# Patient Record
Sex: MALE | Race: WHITE | NOT HISPANIC OR LATINO | Employment: FULL TIME | URBAN - METROPOLITAN AREA
[De-identification: names, ages, dates, MRNs, and addresses within clinical notes are randomized per-mention and may not be internally consistent; named-entity substitution may affect disease eponyms.]

---

## 2019-10-21 ENCOUNTER — OFFICE VISIT (OUTPATIENT)
Dept: UROLOGY | Facility: CLINIC | Age: 69
End: 2019-10-21
Payer: MEDICARE

## 2019-10-21 VITALS
SYSTOLIC BLOOD PRESSURE: 112 MMHG | BODY MASS INDEX: 23.78 KG/M2 | TEMPERATURE: 98 F | DIASTOLIC BLOOD PRESSURE: 82 MMHG | WEIGHT: 148 LBS | RESPIRATION RATE: 18 BRPM | HEIGHT: 66 IN | HEART RATE: 64 BPM

## 2019-10-21 DIAGNOSIS — E13.9 DIABETES 1.5, MANAGED AS TYPE 2: Primary | ICD-10-CM

## 2019-10-21 DIAGNOSIS — N40.1 BPH WITH URINARY OBSTRUCTION: ICD-10-CM

## 2019-10-21 DIAGNOSIS — N13.8 BPH WITH URINARY OBSTRUCTION: ICD-10-CM

## 2019-10-21 PROCEDURE — 99213 OFFICE O/P EST LOW 20 MIN: CPT | Mod: S$GLB,,, | Performed by: UROLOGY

## 2019-10-21 PROCEDURE — 99213 PR OFFICE/OUTPT VISIT, EST, LEVL III, 20-29 MIN: ICD-10-PCS | Mod: S$GLB,,, | Performed by: UROLOGY

## 2019-10-21 RX ORDER — GLYBURIDE 3 MG/1
3 TABLET ORAL
Qty: 90 TABLET | Refills: 3 | Status: SHIPPED | OUTPATIENT
Start: 2019-10-21 | End: 2022-01-26

## 2019-10-21 RX ORDER — DOXAZOSIN 4 MG/1
4 TABLET ORAL DAILY
Qty: 30 TABLET | Refills: 11 | Status: SHIPPED | OUTPATIENT
Start: 2019-10-21 | End: 2020-10-20

## 2019-10-21 NOTE — PROGRESS NOTES
Subjective:       Patient ID: Cayden Handy is a 69 y.o. male.    Chief Complaint: Annual Exam      HPI: bph with obstruction doing well      Past Medical History:   Past Medical History:   Diagnosis Date    Diabetes 10/21/2019    Retention, urine 10/21/2019       Past Surgical Historical:   Past Surgical History:   Procedure Laterality Date    KIDNEY STONE SURGERY       lithotripsy x2    vericocelectomy      a very long time cooley/ pt does not remember which side        Medications:   Medication List with Changes/Refills   New Medications    DOXAZOSIN (CARDURA) 4 MG TABLET    Take 1 tablet (4 mg total) by mouth once daily.    GLYBURIDE MICRONIZED (GLYNASE) 3 MG TAB    Take 1 tablet (3 mg total) by mouth daily with breakfast.        Past Social History:   Social History     Socioeconomic History    Marital status:      Spouse name: Not on file    Number of children: Not on file    Years of education: Not on file    Highest education level: Not on file   Occupational History    Not on file   Social Needs    Financial resource strain: Not on file    Food insecurity:     Worry: Not on file     Inability: Not on file    Transportation needs:     Medical: Not on file     Non-medical: Not on file   Tobacco Use    Smoking status: Never Smoker    Smokeless tobacco: Never Used   Substance and Sexual Activity    Alcohol use: Yes     Frequency: Monthly or less    Drug use: Never    Sexual activity: Yes   Lifestyle    Physical activity:     Days per week: Not on file     Minutes per session: Not on file    Stress: Not on file   Relationships    Social connections:     Talks on phone: Not on file     Gets together: Not on file     Attends Congregational service: Not on file     Active member of club or organization: Not on file     Attends meetings of clubs or organizations: Not on file     Relationship status: Not on file   Other Topics Concern    Not on file   Social History Narrative    Not on file        Allergies:   Review of patient's allergies indicates:   Allergen Reactions    Grass pollen-june grass standard         Family History: History reviewed. No pertinent family history.     Review of Systems:  Review of Systems   Constitutional: Negative for activity change and appetite change.   HENT: Negative for congestion and dental problem.    Respiratory: Negative for chest tightness and shortness of breath.    Cardiovascular: Negative for chest pain.   Gastrointestinal: Negative for abdominal distention and abdominal pain.   Genitourinary: Negative for decreased urine volume, difficulty urinating, discharge, dysuria, enuresis, flank pain, frequency, genital sores, hematuria, penile pain, penile swelling, scrotal swelling, testicular pain and urgency.   Musculoskeletal: Negative for back pain and neck pain.   Neurological: Negative for dizziness.   Hematological: Negative for adenopathy.   Psychiatric/Behavioral: Negative for agitation, behavioral problems and confusion.       Physical Exam:  Physical Exam   Nursing note and vitals reviewed.  Constitutional: He is oriented to person, place, and time. He appears well-developed and well-nourished.   HENT:   Head: Normocephalic.   Cardiovascular: Normal rate, regular rhythm and normal heart sounds.    Pulmonary/Chest: Effort normal and breath sounds normal.   Abdominal: Soft. Bowel sounds are normal.   Genitourinary: Rectum normal, prostate normal, testes normal and penis normal.   Neurological: He is alert and oriented to person, place, and time.   Skin: Skin is warm and dry.         Assessment/Plan:     bph with obstruction  Check psa fu one year  Problem List Items Addressed This Visit     None      Visit Diagnoses     Diabetes 1.5, managed as type 2    -  Primary    Relevant Medications    glyBURIDE micronized (GLYNASE) 3 MG Tab    BPH with urinary obstruction        Relevant Medications    doxazosin (CARDURA) 4 MG tablet    glyBURIDE micronized (GLYNASE)  3 MG Tab

## 2020-10-21 ENCOUNTER — OFFICE VISIT (OUTPATIENT)
Dept: UROLOGY | Facility: CLINIC | Age: 70
End: 2020-10-21
Payer: MEDICARE

## 2020-10-21 DIAGNOSIS — N13.8 BPH WITH URINARY OBSTRUCTION: Primary | ICD-10-CM

## 2020-10-21 DIAGNOSIS — N40.1 BPH WITH URINARY OBSTRUCTION: Primary | ICD-10-CM

## 2020-10-21 LAB — POC RESIDUAL URINE VOLUME: 165 ML (ref 0–100)

## 2020-10-21 PROCEDURE — 51798 US URINE CAPACITY MEASURE: CPT | Mod: S$GLB,,, | Performed by: UROLOGY

## 2020-10-21 PROCEDURE — 51798 POCT BLADDER SCAN: ICD-10-PCS | Mod: S$GLB,,, | Performed by: UROLOGY

## 2020-10-21 PROCEDURE — 99213 PR OFFICE/OUTPT VISIT, EST, LEVL III, 20-29 MIN: ICD-10-PCS | Mod: S$GLB,,, | Performed by: UROLOGY

## 2020-10-21 PROCEDURE — 99213 OFFICE O/P EST LOW 20 MIN: CPT | Mod: S$GLB,,, | Performed by: UROLOGY

## 2020-10-21 RX ORDER — DOXAZOSIN 4 MG/1
4 TABLET ORAL NIGHTLY
COMMUNITY
End: 2023-06-22

## 2020-10-21 RX ORDER — MONTELUKAST SODIUM 10 MG/1
TABLET ORAL
COMMUNITY
Start: 2020-10-19

## 2020-10-21 RX ORDER — DAPAGLIFLOZIN AND METFORMIN HYDROCHLORIDE 5; 1000 MG/1; MG/1
10 TABLET, FILM COATED, EXTENDED RELEASE ORAL EVERY MORNING
COMMUNITY
Start: 2020-10-19 | End: 2022-01-26

## 2020-10-21 NOTE — PROGRESS NOTES
Subjective:       Patient ID: Cayden Handy is a 70 y.o. male.    Chief Complaint: Other (yearly )      HPI: 52-year-old male, patient Dr. Ortiz, presents for yearly evaluation.  Patient has a history of BPH with obstruction.  Patient is on Cardura 4 mg daily.  Patient states he is doing.  Patient any pain or burning urination.  Denies any difficulty voiding.  States he has a good stream for the most part.  Patient does complaining of a weak stream in the morning.  Denies any blood in his urine.  Denies any significant weight loss.    No other urinary complaints.  All other health problems appear stable at this time.       Past Medical History:   Past Medical History:   Diagnosis Date    Diabetes 10/21/2019    Retention, urine 10/21/2019       Past Surgical Historical:   Past Surgical History:   Procedure Laterality Date    KIDNEY STONE SURGERY       lithotripsy x2    vericocelectomy      a very long time cooley/ pt does not remember which side        Medications:   Medication List with Changes/Refills   Current Medications    DOXAZOSIN (CARDURA) 4 MG TABLET    Take 1 tablet (4 mg total) by mouth once daily.    DOXAZOSIN (CARDURA) 4 MG TABLET    Take 4 mg by mouth every evening.    GLYBURIDE MICRONIZED (GLYNASE) 3 MG TAB    Take 1 tablet (3 mg total) by mouth daily with breakfast.    MONTELUKAST (SINGULAIR) 10 MG TABLET        XIGDUO XR 5-1,000 MG TBPH            Past Social History:   Social History     Socioeconomic History    Marital status:      Spouse name: Not on file    Number of children: Not on file    Years of education: Not on file    Highest education level: Not on file   Occupational History    Not on file   Social Needs    Financial resource strain: Not on file    Food insecurity     Worry: Not on file     Inability: Not on file    Transportation needs     Medical: Not on file     Non-medical: Not on file   Tobacco Use    Smoking status: Never Smoker    Smokeless tobacco: Never  Used   Substance and Sexual Activity    Alcohol use: Yes     Frequency: Monthly or less    Drug use: Never    Sexual activity: Yes   Lifestyle    Physical activity     Days per week: Not on file     Minutes per session: Not on file    Stress: Not on file   Relationships    Social connections     Talks on phone: Not on file     Gets together: Not on file     Attends [employer]ats[employer] service: Not on file     Active member of club or organization: Not on file     Attends meetings of clubs or organizations: Not on file     Relationship status: Not on file   Other Topics Concern    Not on file   Social History Narrative    Not on file       Allergies:   Review of patient's allergies indicates:   Allergen Reactions    Grass pollen-ray grass standard         Family History: History reviewed. No pertinent family history.     Review of Systems:  Review of Systems   Constitutional: Negative for activity change and appetite change.   HENT: Negative for congestion and dental problem.    Respiratory: Negative for chest tightness and shortness of breath.    Cardiovascular: Negative for chest pain.   Gastrointestinal: Negative for abdominal distention and abdominal pain.   Genitourinary: Negative for decreased urine volume, difficulty urinating, discharge, dysuria, enuresis, flank pain, frequency, genital sores, hematuria, penile pain, penile swelling, scrotal swelling, testicular pain and urgency.   Musculoskeletal: Negative for back pain and neck pain.   Neurological: Negative for dizziness.   Hematological: Negative for adenopathy.   Psychiatric/Behavioral: Negative for agitation, behavioral problems and confusion.       Physical Exam:  Physical Exam  Vitals signs and nursing note reviewed.   Constitutional:       Appearance: He is well-developed.   HENT:      Head: Normocephalic.   Cardiovascular:      Rate and Rhythm: Normal rate and regular rhythm.      Heart sounds: Normal heart sounds.   Pulmonary:      Effort:  Pulmonary effort is normal.      Breath sounds: Normal breath sounds.   Abdominal:      General: Bowel sounds are normal.      Palpations: Abdomen is soft.   Genitourinary:     Prostate: Normal.      Comments: Prostate exam is benign.  Prostate is smooth with no nodules and nontender.  Prostate is symmetrical.  Skin:     General: Skin is warm and dry.   Neurological:      Mental Status: He is alert and oriented to person, place, and time.      Bladder scan:  155 cc.    Assessment/Plan:   BPH with obstruction:  Will check the patient's PSA.  We will notify him of any abnormal results.  Patient's Cardura is prescribed by his primary care doctor.  Patient can notify us if he needs any refills.    Patient follow-up in 1 year, sooner if needed.    Problem List Items Addressed This Visit     None      Visit Diagnoses     BPH with urinary obstruction    -  Primary    Relevant Orders    POCT Bladder Scan    Prostate Specific Antigen, Diagnostic

## 2020-10-22 ENCOUNTER — DOCUMENTATION ONLY (OUTPATIENT)
Dept: UROLOGY | Facility: CLINIC | Age: 70
End: 2020-10-22

## 2021-10-26 ENCOUNTER — TELEPHONE (OUTPATIENT)
Dept: UROLOGY | Facility: CLINIC | Age: 71
End: 2021-10-26

## 2021-10-26 ENCOUNTER — OFFICE VISIT (OUTPATIENT)
Dept: UROLOGY | Facility: CLINIC | Age: 71
End: 2021-10-26
Payer: MEDICARE

## 2021-10-26 VITALS — HEIGHT: 66 IN | WEIGHT: 130 LBS | BODY MASS INDEX: 20.89 KG/M2

## 2021-10-26 DIAGNOSIS — R31.29 HEMATURIA, MICROSCOPIC: ICD-10-CM

## 2021-10-26 DIAGNOSIS — N40.1 BPH WITH URINARY OBSTRUCTION: Primary | ICD-10-CM

## 2021-10-26 DIAGNOSIS — N13.8 BPH WITH URINARY OBSTRUCTION: Primary | ICD-10-CM

## 2021-10-26 LAB
POC RESIDUAL URINE VOLUME: 128 ML (ref 0–100)
PSA, DIAGNOSTIC: 0.69 NG/ML (ref 0–4)

## 2021-10-26 PROCEDURE — 99214 OFFICE O/P EST MOD 30 MIN: CPT | Mod: S$GLB,,, | Performed by: NURSE PRACTITIONER

## 2021-10-26 PROCEDURE — 3008F PR BODY MASS INDEX (BMI) DOCUMENTED: ICD-10-PCS | Mod: CPTII,S$GLB,, | Performed by: NURSE PRACTITIONER

## 2021-10-26 PROCEDURE — 1126F AMNT PAIN NOTED NONE PRSNT: CPT | Mod: CPTII,S$GLB,, | Performed by: NURSE PRACTITIONER

## 2021-10-26 PROCEDURE — 1159F MED LIST DOCD IN RCRD: CPT | Mod: CPTII,S$GLB,, | Performed by: NURSE PRACTITIONER

## 2021-10-26 PROCEDURE — 51798 POCT BLADDER SCAN: ICD-10-PCS | Mod: S$GLB,,, | Performed by: NURSE PRACTITIONER

## 2021-10-26 PROCEDURE — 51798 US URINE CAPACITY MEASURE: CPT | Mod: S$GLB,,, | Performed by: NURSE PRACTITIONER

## 2021-10-26 PROCEDURE — 3008F BODY MASS INDEX DOCD: CPT | Mod: CPTII,S$GLB,, | Performed by: NURSE PRACTITIONER

## 2021-10-26 PROCEDURE — 1160F RVW MEDS BY RX/DR IN RCRD: CPT | Mod: CPTII,S$GLB,, | Performed by: NURSE PRACTITIONER

## 2021-10-26 PROCEDURE — 1160F PR REVIEW ALL MEDS BY PRESCRIBER/CLIN PHARMACIST DOCUMENTED: ICD-10-PCS | Mod: CPTII,S$GLB,, | Performed by: NURSE PRACTITIONER

## 2021-10-26 PROCEDURE — 1159F PR MEDICATION LIST DOCUMENTED IN MEDICAL RECORD: ICD-10-PCS | Mod: CPTII,S$GLB,, | Performed by: NURSE PRACTITIONER

## 2021-10-26 PROCEDURE — 1126F PR PAIN SEVERITY QUANTIFIED, NO PAIN PRESENT: ICD-10-PCS | Mod: CPTII,S$GLB,, | Performed by: NURSE PRACTITIONER

## 2021-10-26 PROCEDURE — 99214 PR OFFICE/OUTPT VISIT, EST, LEVL IV, 30-39 MIN: ICD-10-PCS | Mod: S$GLB,,, | Performed by: NURSE PRACTITIONER

## 2021-10-26 RX ORDER — ROSUVASTATIN CALCIUM 20 MG/1
TABLET, COATED ORAL
COMMUNITY
Start: 2021-09-10

## 2021-10-26 RX ORDER — GABAPENTIN 300 MG/1
300 CAPSULE ORAL NIGHTLY
COMMUNITY
Start: 2021-08-03

## 2021-10-26 RX ORDER — BLOOD SUGAR DIAGNOSTIC
STRIP MISCELLANEOUS
COMMUNITY
Start: 2021-08-05

## 2022-01-26 ENCOUNTER — TELEPHONE (OUTPATIENT)
Dept: UROLOGY | Facility: CLINIC | Age: 72
End: 2022-01-26

## 2022-01-26 ENCOUNTER — OFFICE VISIT (OUTPATIENT)
Dept: UROLOGY | Facility: CLINIC | Age: 72
End: 2022-01-26
Payer: MEDICARE

## 2022-01-26 VITALS
SYSTOLIC BLOOD PRESSURE: 150 MMHG | BODY MASS INDEX: 19.93 KG/M2 | WEIGHT: 124 LBS | RESPIRATION RATE: 20 BRPM | DIASTOLIC BLOOD PRESSURE: 87 MMHG | HEIGHT: 66 IN | HEART RATE: 65 BPM

## 2022-01-26 DIAGNOSIS — N13.8 BPH WITH URINARY OBSTRUCTION: Primary | ICD-10-CM

## 2022-01-26 DIAGNOSIS — N40.1 BPH WITH URINARY OBSTRUCTION: Primary | ICD-10-CM

## 2022-01-26 DIAGNOSIS — Z87.448 H/O URETHRAL STRICTURE: ICD-10-CM

## 2022-01-26 DIAGNOSIS — N52.9 ERECTILE DYSFUNCTION, UNSPECIFIED ERECTILE DYSFUNCTION TYPE: ICD-10-CM

## 2022-01-26 DIAGNOSIS — R31.29 HEMATURIA, MICROSCOPIC: ICD-10-CM

## 2022-01-26 LAB
POC RESIDUAL URINE VOLUME: 173 ML (ref 0–100)
PSA, DIAGNOSTIC: 0.73 NG/ML (ref 0–4)

## 2022-01-26 PROCEDURE — 99214 OFFICE O/P EST MOD 30 MIN: CPT | Mod: S$GLB,,, | Performed by: NURSE PRACTITIONER

## 2022-01-26 PROCEDURE — 51798 POCT BLADDER SCAN: ICD-10-PCS | Mod: S$GLB,,, | Performed by: NURSE PRACTITIONER

## 2022-01-26 PROCEDURE — 99214 PR OFFICE/OUTPT VISIT, EST, LEVL IV, 30-39 MIN: ICD-10-PCS | Mod: S$GLB,,, | Performed by: NURSE PRACTITIONER

## 2022-01-26 PROCEDURE — 51798 US URINE CAPACITY MEASURE: CPT | Mod: S$GLB,,, | Performed by: NURSE PRACTITIONER

## 2022-01-26 RX ORDER — CODEINE PHOSPHATE AND GUAIFENESIN 10; 100 MG/5ML; MG/5ML
SOLUTION ORAL
COMMUNITY
Start: 2022-01-21 | End: 2022-06-16 | Stop reason: ALTCHOICE

## 2022-01-26 RX ORDER — DAPAGLIFLOZIN AND METFORMIN HYDROCHLORIDE 10; 1000 MG/1; MG/1
TABLET, FILM COATED, EXTENDED RELEASE ORAL
COMMUNITY
Start: 2022-01-08 | End: 2022-06-16 | Stop reason: ALTCHOICE

## 2022-01-26 RX ORDER — TADALAFIL 10 MG/1
10 TABLET ORAL DAILY PRN
Qty: 30 TABLET | Refills: 11 | Status: SHIPPED | OUTPATIENT
Start: 2022-01-26 | End: 2023-06-22

## 2022-01-26 RX ORDER — PREDNISONE 10 MG/1
TABLET ORAL
COMMUNITY
Start: 2022-01-21 | End: 2022-06-16 | Stop reason: ALTCHOICE

## 2022-01-26 NOTE — PROGRESS NOTES
Subjective:       Patient ID: Cayden Handy is a 71 y.o. male.    Chief Complaint: Benign Prostatic Hypertrophy (3 month f/u)      HPI: 71-year-old male, established patient, presents for 3 month follow-up.  Patient has history BPH with obstruction.  He is on doxazosin 4 mg daily.  At last visit blood was noted in his urine.  Patient had trace intact blood with red blood cell 6-10.  Patient presents today for re-evaluation of hematuria.  Patient states he does have a history kidney stones.  Patient states he has a history of urethral scar tissue with surgical repair in the past.    Patient does state he has some frequency.  Patient states he voids often throughout the day.  He denies any nocturia.  States the stream is pretty good.  Denies any difficulty or straining voiding.    Patient complains of some erectile dysfunction.  Patient states he has difficulty getting and maintaining erections.  Also erections are not strong.    Denies any blood in urine.  Denies any flank pain.  Denies any odor urine.  Denies any fever.  Denies any body aches.  No other urinary complaints at this time.       Past Medical History:   Past Medical History:   Diagnosis Date    Diabetes 10/21/2019    Retention, urine 10/21/2019       Past Surgical Historical:   Past Surgical History:   Procedure Laterality Date    KIDNEY STONE SURGERY       lithotripsy x2    vericocelectomy      a very long time cooley/ pt does not remember which side        Medications:   Medication List with Changes/Refills   New Medications    TADALAFIL (CIALIS) 10 MG TABLET    Take 1 tablet (10 mg total) by mouth daily as needed for Erectile Dysfunction.   Current Medications    CONTOUR NEXT TEST STRIPS STRP    USE TO CHECK GLUCOSE EVERY MORNING AS DIRECTED    DOXAZOSIN (CARDURA) 4 MG TABLET    Take 4 mg by mouth every evening.    GABAPENTIN (NEURONTIN) 300 MG CAPSULE    Take 300 mg by mouth nightly.    GUAIFENESIN-CODEINE 100-10 MG/5 ML (TUSSI-ORGANIDIN NR)   MG/5 ML SYRUP    TAKE 5 ML BY MOUTH EVERY FOUR HOURS AS NEEDED FOR NIGHT TIME COUGH    MONTELUKAST (SINGULAIR) 10 MG TABLET        PREDNISONE (DELTASONE) 10 MG TABLET    TAKE 3 TABLETS BY MOUTH EVERY MORNING WITH FOOD FOR 5 DAYS    ROSUVASTATIN (CRESTOR) 20 MG TABLET        XIGDUO XR 10-1,000 MG TBPH       Discontinued Medications    GLYBURIDE MICRONIZED (GLYNASE) 3 MG TAB    Take 1 tablet (3 mg total) by mouth daily with breakfast.    XIGDUO XR 5-1,000 MG TBPH    Take 10 mg by mouth every morning.        Past Social History:   Social History     Socioeconomic History    Marital status:    Tobacco Use    Smoking status: Never Smoker    Smokeless tobacco: Never Used   Substance and Sexual Activity    Alcohol use: Yes    Drug use: Never    Sexual activity: Yes       Allergies:   Review of patient's allergies indicates:   Allergen Reactions    Grass pollen-ray grass standard         Family History: History reviewed. No pertinent family history.     Review of Systems:  Review of Systems   Constitutional: Negative for activity change and appetite change.   HENT: Negative for congestion and dental problem.    Eyes: Negative for visual disturbance.   Respiratory: Negative for chest tightness and shortness of breath.    Cardiovascular: Negative for chest pain.   Gastrointestinal: Negative for abdominal distention and abdominal pain.   Genitourinary: Positive for frequency and hematuria. Negative for decreased urine volume, difficulty urinating, dysuria, enuresis, flank pain, genital sores, penile discharge, penile pain, penile swelling, scrotal swelling, testicular pain and urgency.   Musculoskeletal: Negative for back pain and neck pain.   Skin: Negative for color change.   Neurological: Negative for dizziness.   Hematological: Negative for adenopathy.   Psychiatric/Behavioral: Negative for agitation, behavioral problems and confusion.       Physical Exam:  Physical Exam  Vitals and nursing note  reviewed.   Constitutional:       Appearance: He is well-developed and well-nourished.   HENT:      Head: Normocephalic.   Eyes:      Pupils: Pupils are equal, round, and reactive to light.   Cardiovascular:      Rate and Rhythm: Normal rate and regular rhythm.      Heart sounds: Normal heart sounds.   Pulmonary:      Effort: Pulmonary effort is normal.      Breath sounds: Normal breath sounds.   Abdominal:      General: Bowel sounds are normal.      Palpations: Abdomen is soft.   Musculoskeletal:         General: Normal range of motion.      Cervical back: Normal range of motion and neck supple.   Skin:     General: Skin is warm and dry.   Neurological:      Mental Status: He is alert and oriented to person, place, and time.   Psychiatric:         Mood and Affect: Mood and affect normal.         Behavior: Behavior normal.       Urinalysis:  Trace intact blood, blood cells 3-5.  Bladder scan:  173 cc    Assessment/Plan:   1. BPH with obstruction/history of urethral scar tissue.:  Patient's bladder scan is elevated, 173 cc.  Patient can increase his doxazosin to 8 mg. Patient states he has plenty on hand.  Will schedule patient for cysto for further evaluation.    2. Microscopic hematuria:  Patient denies history of smoking.  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  Will schedule patient for CT abdomen pelvis urogram.  Will schedule patient for cysto.    3. Erectile dysfunction:  Patient has complained of difficulty with erections.  Patient will do trial of Cialis 10 mg as needed.  Patient can increase to mg if needed.  Patient will notify us of the results.    Follow-up to be arranged pending CT and cysto.  Problem List Items Addressed This Visit    None     Visit Diagnoses     BPH with urinary obstruction    -  Primary    Relevant Orders    POCT Bladder Scan (Completed)    Prostate Specific Antigen, Diagnostic    Hematuria, microscopic        Relevant Orders    POCT  Urinalysis (w/Micro Option)    CT Urogram Abd Pelvis W WO    Cystoscopy    Creatinine, serum    Erectile dysfunction, unspecified erectile dysfunction type        Relevant Medications    tadalafiL (CIALIS) 10 MG tablet    H/O urethral stricture        Relevant Orders    Cystoscopy

## 2022-02-08 ENCOUNTER — TELEPHONE (OUTPATIENT)
Dept: UROLOGY | Facility: CLINIC | Age: 72
End: 2022-02-08
Payer: MEDICARE

## 2022-02-08 NOTE — TELEPHONE ENCOUNTER
Labs reprinted and faxed with order for Urogram per Sidra Mcintyre.  ASHLEY    ----- Message from Isa Murillo sent at 2/8/2022  3:09 PM CST -----  Kindra calling from [facility] need to speak to nurse regarding patient lab work . Call back number [phone].

## 2022-02-09 ENCOUNTER — TELEPHONE (OUTPATIENT)
Dept: UROLOGY | Facility: CLINIC | Age: 72
End: 2022-02-09
Payer: MEDICARE

## 2022-02-09 LAB
CREAT SERPL-MCNC: 1.17 MG/DL (ref 0.7–1.3)
GFR ESTIMATION: > 60

## 2022-02-09 NOTE — TELEPHONE ENCOUNTER
----- Message from Heather Loco sent at 2/9/2022  9:22 AM CST -----  Contact: Kenrick      Who Called Kindra       Does the patient know what this is regarding? Order for BUN  creatinine  Would the patient rather a call back or a response via [facility]? Callback   Best Call Back Number [phone]  Additional Information:

## 2022-02-10 ENCOUNTER — TELEPHONE (OUTPATIENT)
Dept: UROLOGY | Facility: CLINIC | Age: 72
End: 2022-02-10
Payer: MEDICARE

## 2022-02-10 NOTE — TELEPHONE ENCOUNTER
----- Message from Erickson Mccall NP sent at 2/9/2022  3:27 PM CST -----  No masses or stones noted in kidneys.  Bladder is unremarkable.    Proceed with cysto as scheduled.      Minimal infiltrates in lower lung.  Gallstones noted.  Follow up with PCP for further eval.

## 2022-03-07 ENCOUNTER — TELEPHONE (OUTPATIENT)
Dept: UROLOGY | Facility: CLINIC | Age: 72
End: 2022-03-07
Payer: MEDICARE

## 2022-03-08 ENCOUNTER — PROCEDURE VISIT (OUTPATIENT)
Dept: UROLOGY | Facility: CLINIC | Age: 72
End: 2022-03-08
Payer: MEDICARE

## 2022-03-08 VITALS
DIASTOLIC BLOOD PRESSURE: 68 MMHG | WEIGHT: 131 LBS | SYSTOLIC BLOOD PRESSURE: 142 MMHG | BODY MASS INDEX: 21.14 KG/M2 | HEART RATE: 62 BPM

## 2022-03-08 DIAGNOSIS — R31.29 HEMATURIA, MICROSCOPIC: ICD-10-CM

## 2022-03-08 DIAGNOSIS — Z87.448 H/O URETHRAL STRICTURE: Primary | ICD-10-CM

## 2022-03-08 LAB
ANION GAP SERPL CALC-SCNC: 5 MMOL/L (ref 3–11)
APPEARANCE, UA: CLEAR
BACTERIA SPEC CULT: ABNORMAL /HPF
BASOPHILS NFR BLD: 0.4 % (ref 0–3)
BILIRUB UR QL STRIP: NEGATIVE MG/DL
BUN SERPL-MCNC: 20 MG/DL (ref 7–18)
BUN/CREAT SERPL: 19.6 RATIO (ref 7–18)
CALCIUM SERPL-MCNC: 9.1 MG/DL (ref 8.8–10.5)
CHLORIDE SERPL-SCNC: 107 MMOL/L (ref 100–108)
CO2 SERPL-SCNC: 32 MMOL/L (ref 21–32)
COLOR UR: ABNORMAL
CREAT SERPL-MCNC: 1.02 MG/DL (ref 0.7–1.3)
EOSINOPHIL NFR BLD: 4.4 % (ref 1–3)
ERYTHROCYTE [DISTWIDTH] IN BLOOD BY AUTOMATED COUNT: 13.5 % (ref 12.5–18)
GFR ESTIMATION: > 60
GLUCOSE (UA): 1000 MG/DL
GLUCOSE SERPL-MCNC: 126 MG/DL (ref 70–110)
HCT VFR BLD AUTO: 46.4 % (ref 42–52)
HGB BLD-MCNC: 15.3 G/DL (ref 14–18)
HGB UR QL STRIP: 250 /UL
KETONES UR QL STRIP: NEGATIVE MG/DL
LEUKOCYTE ESTERASE UR QL STRIP: NEGATIVE /UL
LYMPHOCYTES NFR BLD: 23.1 % (ref 25–40)
MCH RBC QN AUTO: 31.9 PG (ref 27–31.2)
MCHC RBC AUTO-ENTMCNC: 33 G/DL (ref 31.8–35.4)
MCV RBC AUTO: 96.9 FL (ref 80–97)
MONOCYTES NFR BLD: 6.9 % (ref 1–15)
NEUTROPHILS # BLD AUTO: 4.91 10*3/UL (ref 1.8–7.7)
NEUTROPHILS NFR BLD: 65.1 % (ref 37–80)
NITRITE UR QL STRIP: NEGATIVE
NUCLEATED RED BLOOD CELLS: 0 %
PH UR STRIP: 5 PH (ref 5–9)
PLATELETS: 179 10*3/UL (ref 142–424)
POTASSIUM SERPL-SCNC: 4.3 MMOL/L (ref 3.6–5.2)
PROT UR QL STRIP: NEGATIVE MG/DL
RBC # BLD AUTO: 4.79 10*6/UL (ref 4.7–6.1)
RBC #/AREA URNS HPF: ABNORMAL /HPF (ref 0–2)
SERVICE COMMENT 03: ABNORMAL
SODIUM BLD-SCNC: 144 MMOL/L (ref 135–145)
SP GR UR STRIP: 1.01 (ref 1–1.03)
SPECIMEN COLLECTION METHOD, URINE: ABNORMAL
SPERM, URINE: ABNORMAL /HPF
SQUAMOUS EPITHELIAL, UA: ABNORMAL /LPF
UROBILINOGEN UR STRIP-ACNC: NORMAL MG/DL
WBC # BLD: 7.5 10*3/UL (ref 4.6–10.2)
WBC #/AREA URNS HPF: ABNORMAL /HPF (ref 0–5)

## 2022-03-08 PROCEDURE — 52000 CYSTOSCOPY: ICD-10-PCS | Mod: S$GLB,,, | Performed by: UROLOGY

## 2022-03-08 PROCEDURE — 52000 CYSTOURETHROSCOPY: CPT | Mod: S$GLB,,, | Performed by: UROLOGY

## 2022-03-08 NOTE — PROCEDURES
"Cystoscopy    Date/Time: 3/8/2022 1:20 PM  Performed by: Leandro Guerra MD  Authorized by: Erickson Mccall NP     Consent Done?:  Yes (Written)  Time out: Immediately prior to procedure a "time out" was called to verify the correct patient, procedure, equipment, support staff and site/side marked as required.    Indications: hematuria    Position:  Supine  Anesthesia:  Intraurethral instillation  Patient sedated?: No    Preparation: Patient was prepped and draped in usual sterile fashion      Scope type:  Flexible cystoscope  External exam normal: Yes    Urethra normal: Yes    Prostate normal: Yes       Patient was brought to the procedure room placed on the table padded prepped and draped in usual sterile fashion in supine position. 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  There was noted to be a significant bladder neck contracture through which the scope could not be advanced  was normal prostate showed expected enlargement for patient's age, the bladder is entered and inspected is found to be free of tumor stone or foreign body.  Bilateral ureteral orifices were identified and noted to be normal in appearance with clear efflux of urine at this point the scope was removed the patient tolerated the procedure well there were no complications    Impression:  Urethral stricture disease and bladder neck contracture rule proceed with DVIU and TUIBNC next available date      "

## 2022-03-21 ENCOUNTER — OUTSIDE PLACE OF SERVICE (OUTPATIENT)
Dept: UROLOGY | Facility: CLINIC | Age: 72
End: 2022-03-21
Payer: MEDICARE

## 2022-03-21 LAB
GLUCOSE SERPL-MCNC: 104 MG/DL (ref 70–105)
GLUCOSE SERPL-MCNC: 97 MG/DL (ref 70–105)

## 2022-03-21 PROCEDURE — 52500 TRURL RESECTION BLADDER NECK: CPT | Mod: ,,, | Performed by: UROLOGY

## 2022-03-21 PROCEDURE — 52276 PR CYSTOSCOPY,DIR VIS INT URETHROTOMY: ICD-10-PCS | Mod: 51,,, | Performed by: UROLOGY

## 2022-03-21 PROCEDURE — 52276 CYSTOSCOPY AND TREATMENT: CPT | Mod: 51,,, | Performed by: UROLOGY

## 2022-03-21 PROCEDURE — 52500 PR TRANSURETHRAL RESEC BLADDER NECK: ICD-10-PCS | Mod: ,,, | Performed by: UROLOGY

## 2022-03-28 ENCOUNTER — CLINICAL SUPPORT (OUTPATIENT)
Dept: UROLOGY | Facility: CLINIC | Age: 72
End: 2022-03-28
Payer: MEDICARE

## 2022-03-28 DIAGNOSIS — Z87.448 H/O URETHRAL STRICTURE: Primary | ICD-10-CM

## 2022-03-28 NOTE — PROGRESS NOTES
Patient presented to clinic for voiding trial and catheter removal. 150ml clear yellow urine emptied via catheter/cath bag. 400cc sterile water inserted into bladder via catheter, sterile technique maintained. 10cc removed from catheter balloon, 18F zavala catheter removed and 300ml of clear yellow urine voided into urinal. Advised pt to continue to hydrate adequately with clear liquids and call clinic before 2pm should any discomfort or difficulty urinating occur. Pt verbalized understanding. lpn

## 2022-03-29 ENCOUNTER — CLINICAL SUPPORT (OUTPATIENT)
Dept: UROLOGY | Facility: CLINIC | Age: 72
End: 2022-03-29
Payer: MEDICARE

## 2022-03-29 DIAGNOSIS — N13.8 BPH WITH URINARY OBSTRUCTION: ICD-10-CM

## 2022-03-29 DIAGNOSIS — Z87.448 H/O URETHRAL STRICTURE: Primary | ICD-10-CM

## 2022-03-29 DIAGNOSIS — N40.1 BPH WITH URINARY OBSTRUCTION: ICD-10-CM

## 2022-03-29 LAB — POC RESIDUAL URINE VOLUME: 196 ML (ref 0–100)

## 2022-03-29 PROCEDURE — 51798 US URINE CAPACITY MEASURE: CPT | Mod: S$GLB,,, | Performed by: UROLOGY

## 2022-03-29 PROCEDURE — 51798 POCT BLADDER SCAN: ICD-10-PCS | Mod: S$GLB,,, | Performed by: UROLOGY

## 2022-03-29 NOTE — PROGRESS NOTES
Pt returned to clinic after VT yesterday. Pt stated he had on ly a weak stream of urine and didn't feel like he was emptying bladder completely. Pt voided and a bladder scan was porformed. Pt had >196 urine left in bladder. It was then determind that pt needed cath replaced. Sterile technique was used. Patient was cleaned with iodine and 18F coude catheter was placed. 10cc of sterile water was inserted into balloon to inflate. Pt tolerated placement well. Patient educated on care of catheter and s/s to watch for to return to clinic. Pt verbalized understanding.

## 2022-04-07 ENCOUNTER — OFFICE VISIT (OUTPATIENT)
Dept: UROLOGY | Facility: CLINIC | Age: 72
End: 2022-04-07
Payer: MEDICARE

## 2022-04-07 VITALS
TEMPERATURE: 99 F | SYSTOLIC BLOOD PRESSURE: 135 MMHG | WEIGHT: 131 LBS | HEIGHT: 66 IN | BODY MASS INDEX: 21.05 KG/M2 | HEART RATE: 58 BPM | DIASTOLIC BLOOD PRESSURE: 62 MMHG

## 2022-04-07 DIAGNOSIS — N32.0 BLADDER NECK CONTRACTURE: Primary | ICD-10-CM

## 2022-04-07 PROCEDURE — 1159F PR MEDICATION LIST DOCUMENTED IN MEDICAL RECORD: ICD-10-PCS | Mod: CPTII,S$GLB,, | Performed by: UROLOGY

## 2022-04-07 PROCEDURE — 3075F PR MOST RECENT SYSTOLIC BLOOD PRESS GE 130-139MM HG: ICD-10-PCS | Mod: CPTII,S$GLB,, | Performed by: UROLOGY

## 2022-04-07 PROCEDURE — 99024 POSTOP FOLLOW-UP VISIT: CPT | Mod: S$GLB,,, | Performed by: UROLOGY

## 2022-04-07 PROCEDURE — 1126F AMNT PAIN NOTED NONE PRSNT: CPT | Mod: CPTII,S$GLB,, | Performed by: UROLOGY

## 2022-04-07 PROCEDURE — 3008F BODY MASS INDEX DOCD: CPT | Mod: CPTII,S$GLB,, | Performed by: UROLOGY

## 2022-04-07 PROCEDURE — 3075F SYST BP GE 130 - 139MM HG: CPT | Mod: CPTII,S$GLB,, | Performed by: UROLOGY

## 2022-04-07 PROCEDURE — 3008F PR BODY MASS INDEX (BMI) DOCUMENTED: ICD-10-PCS | Mod: CPTII,S$GLB,, | Performed by: UROLOGY

## 2022-04-07 PROCEDURE — 3078F DIAST BP <80 MM HG: CPT | Mod: CPTII,S$GLB,, | Performed by: UROLOGY

## 2022-04-07 PROCEDURE — 1160F RVW MEDS BY RX/DR IN RCRD: CPT | Mod: CPTII,S$GLB,, | Performed by: UROLOGY

## 2022-04-07 PROCEDURE — 3078F PR MOST RECENT DIASTOLIC BLOOD PRESSURE < 80 MM HG: ICD-10-PCS | Mod: CPTII,S$GLB,, | Performed by: UROLOGY

## 2022-04-07 PROCEDURE — 1160F PR REVIEW ALL MEDS BY PRESCRIBER/CLIN PHARMACIST DOCUMENTED: ICD-10-PCS | Mod: CPTII,S$GLB,, | Performed by: UROLOGY

## 2022-04-07 PROCEDURE — 99024 PR POST-OP FOLLOW-UP VISIT: ICD-10-PCS | Mod: S$GLB,,, | Performed by: UROLOGY

## 2022-04-07 PROCEDURE — 1126F PR PAIN SEVERITY QUANTIFIED, NO PAIN PRESENT: ICD-10-PCS | Mod: CPTII,S$GLB,, | Performed by: UROLOGY

## 2022-04-07 PROCEDURE — 1159F MED LIST DOCD IN RCRD: CPT | Mod: CPTII,S$GLB,, | Performed by: UROLOGY

## 2022-04-07 NOTE — PROGRESS NOTES
Subjective:       Patient ID: Cayden Handy is a 71 y.o. male.    Chief Complaint: Other (TUIBNC F/U)      HPI: 71-year-old male, established patient,   status post TURB N/C here for voiding trial he failed his last voiding trial  Patient states he has a history of urethral scar tissue with surgical repair in the past.    Patient does state he has some frequency.  Patient states he voids often throughout the day.  He denies any nocturia.  States the stream is pretty good.  Denies any difficulty or straining voiding.    Patient complains of some erectile dysfunction.  Patient states he has difficulty getting and maintaining erections.  Also erections are not strong.    Denies any blood in urine.  Denies any flank pain.  Denies any odor urine.  Denies any fever.  Denies any body aches.  No other urinary complaints at this time.       Past Medical History:   Past Medical History:   Diagnosis Date    Diabetes 10/21/2019    Retention, urine 10/21/2019       Past Surgical Historical:   Past Surgical History:   Procedure Laterality Date    KIDNEY STONE SURGERY       lithotripsy x2    vericocelectomy      a very long time cooley/ pt does not remember which side        Medications:   Medication List with Changes/Refills   Current Medications    CONTOUR NEXT TEST STRIPS STRP    USE TO CHECK GLUCOSE EVERY MORNING AS DIRECTED    DOXAZOSIN (CARDURA) 4 MG TABLET    Take 4 mg by mouth every evening.    GABAPENTIN (NEURONTIN) 300 MG CAPSULE    Take 300 mg by mouth nightly.    GUAIFENESIN-CODEINE 100-10 MG/5 ML (TUSSI-ORGANIDIN NR)  MG/5 ML SYRUP    TAKE 5 ML BY MOUTH EVERY FOUR HOURS AS NEEDED FOR NIGHT TIME COUGH    MONTELUKAST (SINGULAIR) 10 MG TABLET        PREDNISONE (DELTASONE) 10 MG TABLET    TAKE 3 TABLETS BY MOUTH EVERY MORNING WITH FOOD FOR 5 DAYS    ROSUVASTATIN (CRESTOR) 20 MG TABLET        TADALAFIL (CIALIS) 10 MG TABLET    Take 1 tablet (10 mg total) by mouth daily as needed for Erectile Dysfunction.     XIGDUO XR 10-1,000 MG TBPH            Past Social History:   Social History     Socioeconomic History    Marital status:    Tobacco Use    Smoking status: Never Smoker    Smokeless tobacco: Never Used   Substance and Sexual Activity    Alcohol use: Yes    Drug use: Never    Sexual activity: Yes       Allergies:   Review of patient's allergies indicates:   Allergen Reactions    Grass pollen-ray grass standard         Family History: History reviewed. No pertinent family history.     Review of Systems:  Review of Systems    Physical Exam:  Physical Exam    Assessment/Plan:       Problem List Items Addressed This Visit    None             status post TUIBNC   We will perform voiding trial the day return to clinic as needed

## 2022-05-18 DIAGNOSIS — R31.29 HEMATURIA, MICROSCOPIC: Primary | ICD-10-CM

## 2022-06-16 ENCOUNTER — OFFICE VISIT (OUTPATIENT)
Dept: UROLOGY | Facility: CLINIC | Age: 72
End: 2022-06-16
Payer: MEDICARE

## 2022-06-16 VITALS
TEMPERATURE: 98 F | HEART RATE: 74 BPM | DIASTOLIC BLOOD PRESSURE: 78 MMHG | WEIGHT: 128 LBS | BODY MASS INDEX: 20.57 KG/M2 | HEIGHT: 66 IN | RESPIRATION RATE: 16 BRPM | SYSTOLIC BLOOD PRESSURE: 140 MMHG

## 2022-06-16 DIAGNOSIS — R31.29 HEMATURIA, MICROSCOPIC: Primary | ICD-10-CM

## 2022-06-16 PROCEDURE — 99214 PR OFFICE/OUTPT VISIT, EST, LEVL IV, 30-39 MIN: ICD-10-PCS | Mod: 24,S$GLB,, | Performed by: NURSE PRACTITIONER

## 2022-06-16 PROCEDURE — 3008F BODY MASS INDEX DOCD: CPT | Mod: CPTII,S$GLB,, | Performed by: NURSE PRACTITIONER

## 2022-06-16 PROCEDURE — 1160F RVW MEDS BY RX/DR IN RCRD: CPT | Mod: CPTII,S$GLB,, | Performed by: NURSE PRACTITIONER

## 2022-06-16 PROCEDURE — 3078F PR MOST RECENT DIASTOLIC BLOOD PRESSURE < 80 MM HG: ICD-10-PCS | Mod: CPTII,S$GLB,, | Performed by: NURSE PRACTITIONER

## 2022-06-16 PROCEDURE — 3008F PR BODY MASS INDEX (BMI) DOCUMENTED: ICD-10-PCS | Mod: CPTII,S$GLB,, | Performed by: NURSE PRACTITIONER

## 2022-06-16 PROCEDURE — 3077F PR MOST RECENT SYSTOLIC BLOOD PRESSURE >= 140 MM HG: ICD-10-PCS | Mod: CPTII,S$GLB,, | Performed by: NURSE PRACTITIONER

## 2022-06-16 PROCEDURE — 99214 OFFICE O/P EST MOD 30 MIN: CPT | Mod: 24,S$GLB,, | Performed by: NURSE PRACTITIONER

## 2022-06-16 PROCEDURE — 3078F DIAST BP <80 MM HG: CPT | Mod: CPTII,S$GLB,, | Performed by: NURSE PRACTITIONER

## 2022-06-16 PROCEDURE — 1159F PR MEDICATION LIST DOCUMENTED IN MEDICAL RECORD: ICD-10-PCS | Mod: CPTII,S$GLB,, | Performed by: NURSE PRACTITIONER

## 2022-06-16 PROCEDURE — 3077F SYST BP >= 140 MM HG: CPT | Mod: CPTII,S$GLB,, | Performed by: NURSE PRACTITIONER

## 2022-06-16 PROCEDURE — 1160F PR REVIEW ALL MEDS BY PRESCRIBER/CLIN PHARMACIST DOCUMENTED: ICD-10-PCS | Mod: CPTII,S$GLB,, | Performed by: NURSE PRACTITIONER

## 2022-06-16 PROCEDURE — 1159F MED LIST DOCD IN RCRD: CPT | Mod: CPTII,S$GLB,, | Performed by: NURSE PRACTITIONER

## 2022-06-16 PROCEDURE — 1126F AMNT PAIN NOTED NONE PRSNT: CPT | Mod: CPTII,S$GLB,, | Performed by: NURSE PRACTITIONER

## 2022-06-16 PROCEDURE — 1126F PR PAIN SEVERITY QUANTIFIED, NO PAIN PRESENT: ICD-10-PCS | Mod: CPTII,S$GLB,, | Performed by: NURSE PRACTITIONER

## 2022-06-16 RX ORDER — GLIPIZIDE 5 MG/1
TABLET ORAL
COMMUNITY
Start: 2022-06-15

## 2022-06-16 RX ORDER — METFORMIN HYDROCHLORIDE 500 MG/1
TABLET, EXTENDED RELEASE ORAL
COMMUNITY
Start: 2022-05-13

## 2022-06-16 NOTE — PROGRESS NOTES
Subjective:       Patient ID: Cayden Handy is a 71 y.o. male.    Chief Complaint: Hematuria      HPI: 71-year-old male known to service history urethral stricture disease and bladder neck contracture rectified March 2022 in surgical setting care of Dr. Guerra.  Patient is referred back by PCP for micro hematuria.  Patient has seen no blood in the urine he continues to void without difficulty.  States his 1st void in the a.m. is a little slow, but after the remainder of the day and through the evening everything is fine.  Patient denies any symptoms of UTI.  No other urologic concerns at this time.  Last PSA 0.734 drawn January 26, 2022.        Past Medical History:   Past Medical History:   Diagnosis Date    Allergy     Diabetes 10/21/2019    Kidney stone     Mixed hyperlipidemia     Neuropathy     Retention, urine 10/21/2019       Past Surgical Historical:   Past Surgical History:   Procedure Laterality Date    KIDNEY STONE SURGERY       lithotripsy x2    LITHOTRIPSY      vericocelectomy      a very long time cooley/ pt does not remember which side    WIDSOM TEETH          Medications:   Medication List with Changes/Refills   Current Medications    CONTOUR NEXT TEST STRIPS STRP    USE TO CHECK GLUCOSE EVERY MORNING AS DIRECTED    DOXAZOSIN (CARDURA) 4 MG TABLET    Take 4 mg by mouth every evening.    GABAPENTIN (NEURONTIN) 300 MG CAPSULE    Take 300 mg by mouth nightly.    GLIPIZIDE (GLUCOTROL) 5 MG TABLET        GUAIFENESIN-CODEINE 100-10 MG/5 ML (TUSSI-ORGANIDIN NR)  MG/5 ML SYRUP    TAKE 5 ML BY MOUTH EVERY FOUR HOURS AS NEEDED FOR NIGHT TIME COUGH    METFORMIN (GLUCOPHAGE-XR) 500 MG ER 24HR TABLET        MONTELUKAST (SINGULAIR) 10 MG TABLET        PREDNISONE (DELTASONE) 10 MG TABLET    TAKE 3 TABLETS BY MOUTH EVERY MORNING WITH FOOD FOR 5 DAYS    ROSUVASTATIN (CRESTOR) 20 MG TABLET        TADALAFIL (CIALIS) 10 MG TABLET    Take 1 tablet (10 mg total) by mouth daily as needed for Erectile  Dysfunction.   Discontinued Medications    XIGDUO XR 10-1,000 MG TBPH            Past Social History:   Social History     Socioeconomic History    Marital status:    Tobacco Use    Smoking status: Never Smoker    Smokeless tobacco: Never Used   Substance and Sexual Activity    Alcohol use: Yes     Alcohol/week: 1.0 standard drink     Types: 1 Cans of beer per week     Comment: OCCASIONALLY    Drug use: Never    Sexual activity: Yes       Allergies:   Review of patient's allergies indicates:   Allergen Reactions    Grass pollen-ray grass standard         Family History:   Family History   Problem Relation Age of Onset    Heart disease Father     Hypertension Father     Diabetes Father     Diabetes Mother     Hypertension Mother     Heart disease Mother         Review of Systems:  Review of Systems   Constitutional: Negative for activity change and appetite change.   HENT: Negative for congestion and dental problem.    Eyes: Negative for visual disturbance.   Respiratory: Negative for chest tightness and shortness of breath.    Cardiovascular: Negative for chest pain.   Gastrointestinal: Negative for abdominal distention and abdominal pain.   Genitourinary: Negative for decreased urine volume, difficulty urinating, dysuria, enuresis, flank pain, frequency, genital sores, hematuria, penile discharge, penile pain, penile swelling, scrotal swelling, testicular pain and urgency.   Musculoskeletal: Negative for back pain and neck pain.   Skin: Negative for color change.   Neurological: Negative for dizziness.   Hematological: Negative for adenopathy.   Psychiatric/Behavioral: Negative for agitation, behavioral problems and confusion.       Physical Exam:  Physical Exam  Constitutional:       Appearance: He is well-developed.   HENT:      Head: Normocephalic.   Eyes:      General: No scleral icterus.  Pulmonary:      Effort: Pulmonary effort is normal.      Breath sounds: Normal breath sounds.    Abdominal:      General: There is no distension.      Palpations: Abdomen is soft.      Tenderness: There is no abdominal tenderness.      Hernia: No hernia is present. There is no hernia in the right inguinal area or left inguinal area.   Genitourinary:     Penis: Normal.       Testes: Normal. Cremasteric reflex is present.   Musculoskeletal:      Cervical back: Normal range of motion.   Skin:     General: Skin is warm and dry.   Neurological:      Mental Status: He is alert and oriented to person, place, and time.         Assessment/Plan:   Abnormal urinalysis on urinary dip testing--urinalysis today urinary dip shows moderate blood with small leukocytes otherwise negative.  microscopic exam of same sample identifies 25-50 white cells, 0-3 red cells, 1+ skin and 2+ bacteria.  No further workup is indicated-- 0 3 red cells microscopically is within the normal reference range.  Patient has had a CT scan of the abdomen and pelvis in February of 2022 negative for masses stones or tumors.  He has also had a look in his bladder in March 2022 with his hospitalization for urethral dilation and TUR of a BNC.  No stones foreign bodies or tumors were seen in the bladder.    Urethral stricture disease/bladder neck contracture--resolved status post surgical intervention earlier this year.  Patient continues do well, monitor for regression  Problem List Items Addressed This Visit    None     Visit Diagnoses     Hematuria, microscopic    -  Primary    Relevant Orders    POCT Urinalysis (w/Micro Option)

## 2022-12-22 ENCOUNTER — OFFICE VISIT (OUTPATIENT)
Dept: UROLOGY | Facility: CLINIC | Age: 72
End: 2022-12-22
Payer: MEDICARE

## 2022-12-22 DIAGNOSIS — R31.29 HEMATURIA, MICROSCOPIC: Primary | ICD-10-CM

## 2022-12-22 PROCEDURE — 1160F RVW MEDS BY RX/DR IN RCRD: CPT | Mod: CPTII,S$GLB,, | Performed by: NURSE PRACTITIONER

## 2022-12-22 PROCEDURE — 99214 OFFICE O/P EST MOD 30 MIN: CPT | Mod: S$GLB,,, | Performed by: NURSE PRACTITIONER

## 2022-12-22 PROCEDURE — 1160F PR REVIEW ALL MEDS BY PRESCRIBER/CLIN PHARMACIST DOCUMENTED: ICD-10-PCS | Mod: CPTII,S$GLB,, | Performed by: NURSE PRACTITIONER

## 2022-12-22 PROCEDURE — 1159F MED LIST DOCD IN RCRD: CPT | Mod: CPTII,S$GLB,, | Performed by: NURSE PRACTITIONER

## 2022-12-22 PROCEDURE — 1159F PR MEDICATION LIST DOCUMENTED IN MEDICAL RECORD: ICD-10-PCS | Mod: CPTII,S$GLB,, | Performed by: NURSE PRACTITIONER

## 2022-12-22 PROCEDURE — 99214 PR OFFICE/OUTPT VISIT, EST, LEVL IV, 30-39 MIN: ICD-10-PCS | Mod: S$GLB,,, | Performed by: NURSE PRACTITIONER

## 2022-12-22 NOTE — PROGRESS NOTES
Subjective:       Patient ID: Cayden Handy is a 72 y.o. male.    Chief Complaint: 6 month follow up      HPI: 72-year-old male known to service history urethral stricture disease and bladder neck contracture rectified March 2022 in surgical setting care of Dr. Guerra.  History micro hematuria per PCP.  This was on a urinary dip.  Patient was seen in office on referral had 0-3 red cells in the urine.  Actually at that time he had a UTI with bacteria and white cells.  Patient has seen no blood in the urine he continues to void without difficulty.  States his 1st void in the a.m. is a little slow, but after the remainder of the day and through the evening everything is fine.  Patient denies any symptoms of UTI.  No other urologic concerns at this time.  Last PSA 0.734 drawn January 26, 2022.        Past Medical History:   Past Medical History:   Diagnosis Date    Allergy     Diabetes 10/21/2019    Kidney stone     Mixed hyperlipidemia     Neuropathy     Retention, urine 10/21/2019       Past Surgical Historical:   Past Surgical History:   Procedure Laterality Date    KIDNEY STONE SURGERY       lithotripsy x2    LITHOTRIPSY      vericocelectomy      a very long time cooley/ pt does not remember which side    WIDSOM TEETH          Medications:   Medication List with Changes/Refills   Current Medications    CONTOUR NEXT TEST STRIPS STRP    USE TO CHECK GLUCOSE EVERY MORNING AS DIRECTED    DOXAZOSIN (CARDURA) 4 MG TABLET    Take 4 mg by mouth every evening.    GABAPENTIN (NEURONTIN) 300 MG CAPSULE    Take 300 mg by mouth nightly.    GLIPIZIDE (GLUCOTROL) 5 MG TABLET        METFORMIN (GLUCOPHAGE-XR) 500 MG ER 24HR TABLET        MONTELUKAST (SINGULAIR) 10 MG TABLET        ROSUVASTATIN (CRESTOR) 20 MG TABLET        TADALAFIL (CIALIS) 10 MG TABLET    Take 1 tablet (10 mg total) by mouth daily as needed for Erectile Dysfunction.        Past Social History:   Social History     Socioeconomic History    Marital status:     Tobacco Use    Smoking status: Never    Smokeless tobacco: Never   Substance and Sexual Activity    Alcohol use: Yes     Alcohol/week: 1.0 standard drink     Types: 1 Cans of beer per week     Comment: OCCASIONALLY    Drug use: Never    Sexual activity: Yes       Allergies:   Review of patient's allergies indicates:   Allergen Reactions    Grass pollen-ray grass standard         Family History:   Family History   Problem Relation Age of Onset    Heart disease Father     Hypertension Father     Diabetes Father     Diabetes Mother     Hypertension Mother     Heart disease Mother         Review of Systems:  Review of Systems   Constitutional:  Negative for activity change and appetite change.   HENT:  Negative for congestion and dental problem.    Eyes:  Negative for visual disturbance.   Respiratory:  Negative for chest tightness and shortness of breath.    Cardiovascular:  Negative for chest pain.   Gastrointestinal:  Negative for abdominal distention and abdominal pain.   Genitourinary:  Negative for decreased urine volume, difficulty urinating, dysuria, enuresis, flank pain, frequency, genital sores, hematuria, penile discharge, penile pain, penile swelling, scrotal swelling, testicular pain and urgency.   Musculoskeletal:  Negative for back pain and neck pain.   Skin:  Negative for color change.   Neurological:  Negative for dizziness.   Hematological:  Negative for adenopathy.   Psychiatric/Behavioral:  Negative for agitation, behavioral problems and confusion.      Physical Exam:  Physical Exam  Constitutional:       Appearance: He is well-developed.   HENT:      Head: Normocephalic.   Eyes:      General: No scleral icterus.  Pulmonary:      Effort: Pulmonary effort is normal.      Breath sounds: Normal breath sounds.   Abdominal:      General: There is no distension.      Palpations: Abdomen is soft.      Tenderness: There is no abdominal tenderness.      Hernia: No hernia is present. There is no hernia in  the right inguinal area or left inguinal area.   Genitourinary:     Penis: Normal.       Testes: Normal. Cremasteric reflex is present.   Musculoskeletal:      Cervical back: Normal range of motion.   Skin:     General: Skin is warm and dry.   Neurological:      Mental Status: He is alert and oriented to person, place, and time.       Assessment/Plan:    BPH with LUTS--urinalysis negative.  Patient his satisfied at present he is due PSA TOM next visit will see him back in 6 months for that sooner with any problems questions or concerns    Kidney stones--by history non recurrence symptoms asymptomatic.  Monitor for progression    Micro hematuria on urinary dip--microscopically today urinalysis WBC 0 5 RBC 0-3 1+ epi 2+ bacteria nitrite negative monitor for progression.  Problem List Items Addressed This Visit    None

## 2023-06-22 ENCOUNTER — TELEPHONE (OUTPATIENT)
Dept: UROLOGY | Facility: CLINIC | Age: 73
End: 2023-06-22

## 2023-06-22 ENCOUNTER — OFFICE VISIT (OUTPATIENT)
Dept: UROLOGY | Facility: CLINIC | Age: 73
End: 2023-06-22
Payer: MEDICARE

## 2023-06-22 VITALS — HEART RATE: 65 BPM | DIASTOLIC BLOOD PRESSURE: 81 MMHG | SYSTOLIC BLOOD PRESSURE: 130 MMHG

## 2023-06-22 DIAGNOSIS — N13.8 BPH WITH URINARY OBSTRUCTION: Primary | ICD-10-CM

## 2023-06-22 DIAGNOSIS — N40.1 BPH WITH URINARY OBSTRUCTION: Primary | ICD-10-CM

## 2023-06-22 LAB
BILIRUBIN, UA POC OHS: NEGATIVE
BLOOD, UA POC OHS: ABNORMAL
CLARITY, UA POC OHS: CLEAR
COLOR, UA POC OHS: YELLOW
GLUCOSE, UA POC OHS: 100
KETONES, UA POC OHS: NEGATIVE
LEUKOCYTES, UA POC OHS: ABNORMAL
NITRITE, UA POC OHS: POSITIVE
PH, UA POC OHS: 5
POC RESIDUAL URINE VOLUME: 243 ML (ref 0–100)
PROTEIN, UA POC OHS: NEGATIVE
PSA, DIAGNOSTIC: 1.04 NG/ML (ref 0.1–4)
SPECIFIC GRAVITY, UA POC OHS: 1.02
UROBILINOGEN, UA POC OHS: 0.2

## 2023-06-22 PROCEDURE — 1159F PR MEDICATION LIST DOCUMENTED IN MEDICAL RECORD: ICD-10-PCS | Mod: CPTII,S$GLB,, | Performed by: NURSE PRACTITIONER

## 2023-06-22 PROCEDURE — 3075F SYST BP GE 130 - 139MM HG: CPT | Mod: CPTII,S$GLB,, | Performed by: NURSE PRACTITIONER

## 2023-06-22 PROCEDURE — 3079F DIAST BP 80-89 MM HG: CPT | Mod: CPTII,S$GLB,, | Performed by: NURSE PRACTITIONER

## 2023-06-22 PROCEDURE — 3079F PR MOST RECENT DIASTOLIC BLOOD PRESSURE 80-89 MM HG: ICD-10-PCS | Mod: CPTII,S$GLB,, | Performed by: NURSE PRACTITIONER

## 2023-06-22 PROCEDURE — 1160F PR REVIEW ALL MEDS BY PRESCRIBER/CLIN PHARMACIST DOCUMENTED: ICD-10-PCS | Mod: CPTII,S$GLB,, | Performed by: NURSE PRACTITIONER

## 2023-06-22 PROCEDURE — 51798 POCT BLADDER SCAN: ICD-10-PCS | Mod: S$GLB,,, | Performed by: NURSE PRACTITIONER

## 2023-06-22 PROCEDURE — 99214 PR OFFICE/OUTPT VISIT, EST, LEVL IV, 30-39 MIN: ICD-10-PCS | Mod: S$GLB,,, | Performed by: NURSE PRACTITIONER

## 2023-06-22 PROCEDURE — 36415 PR COLLECTION VENOUS BLOOD,VENIPUNCTURE: ICD-10-PCS | Mod: S$GLB,,, | Performed by: NURSE PRACTITIONER

## 2023-06-22 PROCEDURE — 1159F MED LIST DOCD IN RCRD: CPT | Mod: CPTII,S$GLB,, | Performed by: NURSE PRACTITIONER

## 2023-06-22 PROCEDURE — 36415 COLL VENOUS BLD VENIPUNCTURE: CPT | Mod: S$GLB,,, | Performed by: NURSE PRACTITIONER

## 2023-06-22 PROCEDURE — 1160F RVW MEDS BY RX/DR IN RCRD: CPT | Mod: CPTII,S$GLB,, | Performed by: NURSE PRACTITIONER

## 2023-06-22 PROCEDURE — 81003 URINALYSIS AUTO W/O SCOPE: CPT | Mod: QW,S$GLB,, | Performed by: NURSE PRACTITIONER

## 2023-06-22 PROCEDURE — 3075F PR MOST RECENT SYSTOLIC BLOOD PRESS GE 130-139MM HG: ICD-10-PCS | Mod: CPTII,S$GLB,, | Performed by: NURSE PRACTITIONER

## 2023-06-22 PROCEDURE — 99214 OFFICE O/P EST MOD 30 MIN: CPT | Mod: S$GLB,,, | Performed by: NURSE PRACTITIONER

## 2023-06-22 PROCEDURE — 51798 US URINE CAPACITY MEASURE: CPT | Mod: S$GLB,,, | Performed by: NURSE PRACTITIONER

## 2023-06-22 PROCEDURE — 81003 POCT URINALYSIS(INSTRUMENT): ICD-10-PCS | Mod: QW,S$GLB,, | Performed by: NURSE PRACTITIONER

## 2023-06-22 RX ORDER — ALFUZOSIN HYDROCHLORIDE 10 MG/1
10 TABLET, EXTENDED RELEASE ORAL
Qty: 30 TABLET | Refills: 11 | Status: SHIPPED | OUTPATIENT
Start: 2023-06-22 | End: 2023-07-26

## 2023-06-22 NOTE — PROGRESS NOTES
Subjective:       Patient ID: Cayden Handy is a 72 y.o. male.    Chief Complaint: Follow-up (6 month f/u, PSA)      HPI: 72-year-old male known to service here for yearly follow-up.  He has history of BPH with LUTS on doxazosin.  States it does not seem to be working as well for him any longer.  His stream is now week worst in the a.m.  a little during the day but never really has good pressure.  He does not feel empty to completion.  Patient also has a history of kidney stones with no recurrence symptoms.  History of micro hematuria with a negative workup in the past.  In erectile dysfunction he is failed the oral PDE5 but he and his wife are no longer sexually active and this is not an issue for him.  PSA last year was 0.7       Past Medical History:   Past Medical History:   Diagnosis Date    Allergy     Diabetes 10/21/2019    Kidney stone     Mixed hyperlipidemia     Neuropathy     Retention, urine 10/21/2019       Past Surgical Historical:   Past Surgical History:   Procedure Laterality Date    KIDNEY STONE SURGERY       lithotripsy x2    LITHOTRIPSY      vericocelectomy      a very long time cooley/ pt does not remember which side    WIDSOM TEETH          Medications:   Medication List with Changes/Refills   Current Medications    CONTOUR NEXT TEST STRIPS STRP    USE TO CHECK GLUCOSE EVERY MORNING AS DIRECTED    DOXAZOSIN (CARDURA) 4 MG TABLET    Take 4 mg by mouth every evening.    GABAPENTIN (NEURONTIN) 300 MG CAPSULE    Take 300 mg by mouth nightly.    GLIPIZIDE (GLUCOTROL) 5 MG TABLET        METFORMIN (GLUCOPHAGE-XR) 500 MG ER 24HR TABLET        MONTELUKAST (SINGULAIR) 10 MG TABLET        ROSUVASTATIN (CRESTOR) 20 MG TABLET        TADALAFIL (CIALIS) 10 MG TABLET    Take 1 tablet (10 mg total) by mouth daily as needed for Erectile Dysfunction.        Past Social History:   Social History     Socioeconomic History    Marital status:    Tobacco Use    Smoking status: Never    Smokeless tobacco:  Never   Substance and Sexual Activity    Alcohol use: Yes     Alcohol/week: 1.0 standard drink     Types: 1 Cans of beer per week     Comment: OCCASIONALLY    Drug use: Never    Sexual activity: Yes       Allergies:   Review of patient's allergies indicates:   Allergen Reactions    Grass pollen-ray grass standard         Family History:   Family History   Problem Relation Age of Onset    Heart disease Father     Hypertension Father     Diabetes Father     Diabetes Mother     Hypertension Mother     Heart disease Mother         Review of Systems:  Review of Systems   Constitutional:  Negative for activity change and appetite change.   HENT:  Negative for congestion and dental problem.    Eyes:  Negative for visual disturbance.   Respiratory:  Negative for chest tightness and shortness of breath.    Cardiovascular:  Negative for chest pain.   Gastrointestinal:  Negative for abdominal distention and abdominal pain.   Genitourinary:  Positive for difficulty urinating. Negative for decreased urine volume, dysuria, enuresis, flank pain, frequency, genital sores, hematuria, penile discharge, penile pain, penile swelling, scrotal swelling, testicular pain and urgency.   Musculoskeletal:  Negative for back pain and neck pain.   Skin:  Negative for color change.   Neurological:  Negative for dizziness.   Hematological:  Negative for adenopathy.   Psychiatric/Behavioral:  Negative for agitation, behavioral problems and confusion.      Physical Exam:  Physical Exam  Constitutional:       Appearance: He is well-developed.   HENT:      Head: Normocephalic.   Eyes:      General: No scleral icterus.  Pulmonary:      Effort: Pulmonary effort is normal.      Breath sounds: Normal breath sounds.   Abdominal:      General: There is no distension.      Palpations: Abdomen is soft.      Tenderness: There is no abdominal tenderness.      Hernia: No hernia is present. There is no hernia in the right inguinal area or left inguinal area.    Genitourinary:     Penis: Normal.       Testes: Normal. Cremasteric reflex is present.      Comments: Normal rectal tone, smooth flat benign-feeling prostate  Musculoskeletal:      Cervical back: Normal range of motion.   Skin:     General: Skin is warm and dry.   Neurological:      Mental Status: He is alert and oriented to person, place, and time.       Assessment/Plan:   BPH with LUTS--urinalysis negative for UTI PVR is 243 mL.  Discontinue doxazosin.  New Rx for Uroxatral re-evaluate 1 month.  PSA today    Kidney stone by history--non recurrence symptoms    Benign essential Micro hematuria--stable RBC 2-4    Erectile dysfunction--patient no longer interested in further treatment plan  Problem List Items Addressed This Visit    None

## 2023-07-26 ENCOUNTER — OFFICE VISIT (OUTPATIENT)
Dept: UROLOGY | Facility: CLINIC | Age: 73
End: 2023-07-26
Payer: MEDICARE

## 2023-07-26 DIAGNOSIS — N40.1 BPH WITH URINARY OBSTRUCTION: Primary | ICD-10-CM

## 2023-07-26 DIAGNOSIS — N13.8 BPH WITH URINARY OBSTRUCTION: Primary | ICD-10-CM

## 2023-07-26 PROCEDURE — 99214 PR OFFICE/OUTPT VISIT, EST, LEVL IV, 30-39 MIN: ICD-10-PCS | Mod: S$GLB,,, | Performed by: NURSE PRACTITIONER

## 2023-07-26 PROCEDURE — 1159F PR MEDICATION LIST DOCUMENTED IN MEDICAL RECORD: ICD-10-PCS | Mod: CPTII,S$GLB,, | Performed by: NURSE PRACTITIONER

## 2023-07-26 PROCEDURE — 1159F MED LIST DOCD IN RCRD: CPT | Mod: CPTII,S$GLB,, | Performed by: NURSE PRACTITIONER

## 2023-07-26 PROCEDURE — 1160F PR REVIEW ALL MEDS BY PRESCRIBER/CLIN PHARMACIST DOCUMENTED: ICD-10-PCS | Mod: CPTII,S$GLB,, | Performed by: NURSE PRACTITIONER

## 2023-07-26 PROCEDURE — 1160F RVW MEDS BY RX/DR IN RCRD: CPT | Mod: CPTII,S$GLB,, | Performed by: NURSE PRACTITIONER

## 2023-07-26 PROCEDURE — 99214 OFFICE O/P EST MOD 30 MIN: CPT | Mod: S$GLB,,, | Performed by: NURSE PRACTITIONER

## 2023-07-26 RX ORDER — TAMSULOSIN HYDROCHLORIDE 0.4 MG/1
0.8 CAPSULE ORAL DAILY
Qty: 60 CAPSULE | Refills: 11 | Status: SHIPPED | OUTPATIENT
Start: 2023-07-26 | End: 2024-07-25

## 2023-07-26 RX ORDER — FINASTERIDE 5 MG/1
5 TABLET, FILM COATED ORAL DAILY
Qty: 30 TABLET | Refills: 11 | Status: SHIPPED | OUTPATIENT
Start: 2023-07-26 | End: 2024-07-25

## 2023-07-26 NOTE — PROGRESS NOTES
Subjective:       Patient ID: Cayden Handy is a 72 y.o. male.    Chief Complaint: No chief complaint on file.      HPI: 72-year-old male 1 month follow-up known to service history of BPH with LUTS.  He is having symptoms of weak slow stream with incomplete emptying.  He was on doxazosin I changed him over to alfuzosin last office evaluation he returns today for re-evaluation.  His last PVR was 243 mL at the previous visit.  States he seen no improvement in the voiding pattern i.e. pressure stream inability to start the flow.  PSA was drawn last visit within normal limits.  TOM at that time was benign but an enlarged prostate       Past Medical History:   Past Medical History:   Diagnosis Date    Allergy     Diabetes 10/21/2019    Kidney stone     Mixed hyperlipidemia     Neuropathy     Retention, urine 10/21/2019       Past Surgical Historical:   Past Surgical History:   Procedure Laterality Date    KIDNEY STONE SURGERY       lithotripsy x2    LITHOTRIPSY      vericocelectomy      a very long time cooley/ pt does not remember which side    WIDSOM TEETH          Medications:   Medication List with Changes/Refills   Current Medications    ALFUZOSIN (UROXATRAL) 10 MG TB24    Take 1 tablet (10 mg total) by mouth daily with breakfast.    CONTOUR NEXT TEST STRIPS STRP    USE TO CHECK GLUCOSE EVERY MORNING AS DIRECTED    GABAPENTIN (NEURONTIN) 300 MG CAPSULE    Take 300 mg by mouth nightly.    GLIPIZIDE (GLUCOTROL) 5 MG TABLET        METFORMIN (GLUCOPHAGE-XR) 500 MG ER 24HR TABLET        MONTELUKAST (SINGULAIR) 10 MG TABLET        ROSUVASTATIN (CRESTOR) 20 MG TABLET        TADALAFIL (CIALIS) 10 MG TABLET    Take 1 tablet (10 mg total) by mouth daily as needed for Erectile Dysfunction.        Past Social History:   Social History     Socioeconomic History    Marital status:    Tobacco Use    Smoking status: Never    Smokeless tobacco: Never   Substance and Sexual Activity    Alcohol use: Yes     Alcohol/week: 1.0  standard drink     Types: 1 Cans of beer per week     Comment: OCCASIONALLY    Drug use: Never    Sexual activity: Yes       Allergies:   Review of patient's allergies indicates:   Allergen Reactions    Grass pollen-ray grass standard         Family History:   Family History   Problem Relation Age of Onset    Heart disease Father     Hypertension Father     Diabetes Father     Diabetes Mother     Hypertension Mother     Heart disease Mother         Review of Systems:  Review of Systems   Constitutional:  Negative for activity change and appetite change.   HENT:  Negative for congestion and dental problem.    Eyes:  Negative for visual disturbance.   Respiratory:  Negative for chest tightness and shortness of breath.    Cardiovascular:  Negative for chest pain.   Gastrointestinal:  Negative for abdominal distention and abdominal pain.   Genitourinary:  Positive for difficulty urinating. Negative for decreased urine volume, dysuria, enuresis, flank pain, frequency, genital sores, hematuria, penile discharge, penile pain, penile swelling, scrotal swelling, testicular pain and urgency.   Musculoskeletal:  Negative for back pain and neck pain.   Skin:  Negative for color change.   Neurological:  Negative for dizziness.   Hematological:  Negative for adenopathy.   Psychiatric/Behavioral:  Negative for agitation, behavioral problems and confusion.      Physical Exam:  Physical Exam  Constitutional:       Appearance: He is well-developed.   HENT:      Head: Normocephalic.   Eyes:      General: No scleral icterus.  Pulmonary:      Effort: Pulmonary effort is normal.      Breath sounds: Normal breath sounds.   Abdominal:      General: There is no distension.      Palpations: Abdomen is soft.      Tenderness: There is no abdominal tenderness.      Hernia: No hernia is present. There is no hernia in the right inguinal area or left inguinal area.   Genitourinary:     Penis: Normal.       Testes: Normal. Cremasteric reflex is  present.   Musculoskeletal:      Cervical back: Normal range of motion.   Skin:     General: Skin is warm and dry.   Neurological:      Mental Status: He is alert and oriented to person, place, and time.       Assessment/Plan:   BPH with LUTS-- mL..  Patient states 1st a.m. void is a trickle.  Worse if he tries to push the urine out.  Rest of the day he is able to void without difficulty but there was no pressure just seems to fall out.  We are going to add finasteride 5 mg daily Rx to for pharmacy of choice.  We are going to discontinue alfuzosin I am going to put him on tamsulosin he is not been on that 0.4 to start and then after a week titrate him up to 0.8.  Also schedule cystoscopy for further evaluation he had a TURP several years ago.  Problem List Items Addressed This Visit    None

## 2023-08-08 ENCOUNTER — TELEPHONE (OUTPATIENT)
Dept: UROLOGY | Facility: CLINIC | Age: 73
End: 2023-08-08
Payer: MEDICARE

## 2023-08-08 RX ORDER — BLOOD-GLUCOSE METER
KIT MISCELLANEOUS
COMMUNITY
Start: 2023-05-01

## 2023-08-08 RX ORDER — PANTOPRAZOLE SODIUM 40 MG/1
40 TABLET, DELAYED RELEASE ORAL
COMMUNITY
Start: 2023-06-13

## 2023-08-08 RX ORDER — DOXAZOSIN 4 MG/1
TABLET ORAL
COMMUNITY
Start: 2023-08-01

## 2023-08-08 NOTE — TELEPHONE ENCOUNTER
Faxed alert that pt getting tamsulosin from  at [facility] and Doxazosin by Dr Naomi Hairston at [facility].

## 2023-08-17 ENCOUNTER — PROCEDURE VISIT (OUTPATIENT)
Dept: UROLOGY | Facility: CLINIC | Age: 73
End: 2023-08-17
Payer: MEDICARE

## 2023-08-17 VITALS
HEIGHT: 66 IN | OXYGEN SATURATION: 97 % | DIASTOLIC BLOOD PRESSURE: 75 MMHG | SYSTOLIC BLOOD PRESSURE: 158 MMHG | WEIGHT: 144.44 LBS | HEART RATE: 74 BPM | RESPIRATION RATE: 20 BRPM | BODY MASS INDEX: 23.21 KG/M2

## 2023-08-17 DIAGNOSIS — N40.1 BPH WITH URINARY OBSTRUCTION: ICD-10-CM

## 2023-08-17 DIAGNOSIS — N13.8 BPH WITH URINARY OBSTRUCTION: ICD-10-CM

## 2023-08-17 PROCEDURE — 52000 CYSTOURETHROSCOPY: CPT | Mod: S$GLB,,, | Performed by: UROLOGY

## 2023-08-17 PROCEDURE — 52000 CYSTOSCOPY: ICD-10-PCS | Mod: S$GLB,,, | Performed by: UROLOGY

## 2023-08-17 NOTE — PATIENT INSTRUCTIONS
Patient Education       Cystoscopy Discharge Instructions   About this topic   Your kidneys make urine. It is stored in your bladder. The urethra is a tube at the bottom of the bladder. Urine flows out of this tube. Sometimes, there is a blockage and urine is not able to leave the body.  A cystoscopy is a procedure that lets the doctor see the inside of your bladder and urethra. The doctor does it to:  Look for stones or tumors blocking the bladder and urethra  Look for changes or injury inside the bladder  Take a tissue sample from the inside of your bladder  Look for reasons for blood in the urine, pain with urination, or why you are passing urine often  Look for prostate problems     What care is needed at home?   Ask your doctor what you need to do when you go home. Make sure you ask questions if you do not understand what the doctor says. This way you will know what you need to do.  Take a warm bath or use a warm wet washcloth over the opening to the urethra. This will help to ease any pain. Do this as needed.  Drink 6 to 8 glasses of water a day and 3 to 4 glasses in the first few hours after the procedure to flush out your bladder and reduce irritation.  You may see some blood in your urine for a few days. This is normal.  Empty your bladder as soon as you feel the need to. Don't delay going to the bathroom. It stretches and weakens the bladder.  What follow-up care is needed?   Your doctor may ask you to make visits to the office to check on your progress. Be sure to keep these visits.  If you had a biopsy, talk with your doctor about the results.  What drugs may be needed?   The doctor may order drugs to:  Help with pain  Fight an infection  Help with bladder spasms  Will physical activity be limited?   Talk to your doctor about when you may go back to your normal activities like work, driving, or sex.  What problems could happen?   Bleeding  Infection  Injury to the bladder and urethra  Discomfort in the  urethra area  Burning sensation for a short time  Upset stomach  When do I need to call the doctor?   Signs of infection. These include a fever of 100.4°F (38°C) or higher, chills, pain with passing urine.  Pain that does not go away even with drugs or that lasts longer than 2 days  Too much blood in your urine  Passing large dime-sized clots  Cloudy urine  Little or no urine or not able to pass urine  Abdominal pain and nausea  Teach Back: Helping You Understand   The Teach Back Method helps you understand the information we are giving you. After you talk with the staff, tell them in your own words what you learned. This helps to make sure the staff has described each thing clearly. It also helps to explain things that may have been confusing. Before going home, make sure you can do these:  I can tell you about my procedure.  I can tell you what may help ease my pain.  I can tell you what I will do if I have a fever, chills, or am not able to pass urine.  Where can I learn more?   [employer]  https://www.cancer.org/treatment/understanding-your-diagnosis/tests/endoscopy/cystoscopy.html   [employer]  https://www.cancerresearchuk.org/about-cancer/bladder-cancer/getting-diagnosed/tests-diagnose/cystoscopy   [facility] Choices  http://www.nhs.uk/conditions/Cystoscopy/Pages/Introduction.aspx   Last Reviewed Date   2021-04-22  Consumer Information Use and Disclaimer   This information is not specific medical advice and does not replace information you receive from your health care provider. This is only a brief summary of general information. It does NOT include all information about conditions, illnesses, injuries, tests, procedures, treatments, therapies, discharge instructions or life-style choices that may apply to you. You must talk with your health care provider for complete information about your health and treatment options. This information should not be used to decide whether or not to accept your  health care providers advice, instructions or recommendations. Only your health care provider has the knowledge and training to provide advice that is right for you.  Copyright   Copyright © 2021 [employer] and its affiliates and/or licensors. All rights reserved.

## 2023-08-17 NOTE — PROCEDURES
Cystoscopy    Date/Time: 8/17/2023 3:30 PM    Performed by: Leandro Guerra MD  Authorized by: Ramírez Díaz NP    Consent Done?:  Yes (Written)  Timeout: prior to procedure the correct patient, procedure, and site was verified    Prep: patient was prepped and draped in usual sterile fashion    Anesthesia:  Intraurethral instillation  Indications: hematuria    Position:  Supine  Anesthesia:  Intraurethral instillation  Patient sedated?: No    Preparation: Patient was prepped and draped in usual sterile fashion    Scope type:  Flexible cystoscope  External exam normal: Yes    Urethra normal: Yes    Prostate normal: Yes    Comments:      Patient was brought to the procedure room placed on the table padded prepped and draped in usual sterile fashion in supine position. The cystoscope was inserted into the urethra and advanced the urethra was normal prostate showed expected enlargement for patient's age, the bladder is entered and inspected is found to be free of tumor stone or foreign body.  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

## 2023-12-19 ENCOUNTER — TELEPHONE (OUTPATIENT)
Dept: UROLOGY | Facility: CLINIC | Age: 73
End: 2023-12-19
Payer: MEDICARE

## 2023-12-19 NOTE — TELEPHONE ENCOUNTER
Left message to call back about a medication notification.  ([facility] sent fax reporting pt on alfuzosin, doxazosin, and flomax, [facility] dc'd alfuzosin on 7/26/23)

## 2023-12-27 NOTE — TELEPHONE ENCOUNTER
Spoke with pt, he states he stopped taking Doxazosin but did not know he was supposed to stop the Alfuzosin. I went back over ov note from [facility] on 7/26/23. Pt states understanding.    Also called pharmacy ([facility]) and notified them the pt was not supposed to be taking the alfuzosin, only flomax and finasteride.

## 2024-01-09 ENCOUNTER — TELEPHONE (OUTPATIENT)
Dept: UROLOGY | Facility: CLINIC | Age: 74
End: 2024-01-09
Payer: MEDICARE

## 2024-01-09 NOTE — TELEPHONE ENCOUNTER
Spoke with pt before. Perhaps he just wanted clarification he was to stop the alfuzosin and take finasteride and tamsulosin.

## 2024-01-09 NOTE — TELEPHONE ENCOUNTER
----- Message from Ladi Butterfield MA sent at 1/9/2024 10:33 AM CST -----    ----- Message -----  From: Jacquie Medel  Sent: 1/9/2024  10:22 AM CST  To: [facility] Staff    Type:  Patient Returning Call    Who Called:Cayden Handy  Who Left Message for Patient: Christal   Does the patient know what this is regarding? Discontinuing medication   Would the patient rather a call back or a response via [facility]? BC   Best Call Back Number:[phone]\    Additional Information:  rtc

## 2024-07-23 ENCOUNTER — TELEPHONE (OUTPATIENT)
Dept: UROLOGY | Facility: CLINIC | Age: 74
End: 2024-07-23
Payer: MEDICARE

## 2024-07-23 DIAGNOSIS — N40.1 BPH WITH URINARY OBSTRUCTION: Primary | ICD-10-CM

## 2024-07-23 DIAGNOSIS — N13.8 BPH WITH URINARY OBSTRUCTION: Primary | ICD-10-CM

## 2024-07-23 RX ORDER — FINASTERIDE 5 MG/1
5 TABLET, FILM COATED ORAL DAILY
Qty: 30 TABLET | Refills: 11 | Status: SHIPPED | OUTPATIENT
Start: 2024-07-23 | End: 2025-07-23

## 2024-07-23 RX ORDER — TAMSULOSIN HYDROCHLORIDE 0.4 MG/1
0.8 CAPSULE ORAL DAILY
Qty: 60 CAPSULE | Refills: 11 | Status: SHIPPED | OUTPATIENT
Start: 2024-07-23 | End: 2025-07-23

## 2024-07-23 NOTE — TELEPHONE ENCOUNTER
Pt requesting flomax and finasteride. Pt scheduled for yearly and med requests sent to provider.    ----- Message from Ness Watson sent at 7/23/2024  8:36 AM CDT -----  Patient is calling in regards to will need authorization on medication please send to pharmacy...

## 2024-08-08 ENCOUNTER — OFFICE VISIT (OUTPATIENT)
Dept: UROLOGY | Facility: CLINIC | Age: 74
End: 2024-08-08
Payer: MEDICARE

## 2024-08-08 VITALS
SYSTOLIC BLOOD PRESSURE: 143 MMHG | HEIGHT: 66 IN | DIASTOLIC BLOOD PRESSURE: 69 MMHG | HEART RATE: 63 BPM | BODY MASS INDEX: 22.5 KG/M2 | WEIGHT: 140 LBS

## 2024-08-08 DIAGNOSIS — N40.1 BPH WITH URINARY OBSTRUCTION: Primary | ICD-10-CM

## 2024-08-08 DIAGNOSIS — N13.8 BPH WITH URINARY OBSTRUCTION: Primary | ICD-10-CM

## 2024-08-08 LAB — PSA, DIAGNOSTIC: 0.42 NG/ML (ref 0.1–4)

## 2024-08-08 PROCEDURE — 99214 OFFICE O/P EST MOD 30 MIN: CPT | Mod: ,,, | Performed by: FAMILY MEDICINE

## 2024-08-08 PROCEDURE — 3008F BODY MASS INDEX DOCD: CPT | Mod: CPTII,,, | Performed by: FAMILY MEDICINE

## 2024-08-08 PROCEDURE — 3077F SYST BP >= 140 MM HG: CPT | Mod: CPTII,,, | Performed by: FAMILY MEDICINE

## 2024-08-08 PROCEDURE — 36415 COLL VENOUS BLD VENIPUNCTURE: CPT | Mod: ,,, | Performed by: FAMILY MEDICINE

## 2024-08-08 PROCEDURE — 3078F DIAST BP <80 MM HG: CPT | Mod: CPTII,,, | Performed by: FAMILY MEDICINE

## 2024-08-08 PROCEDURE — 1101F PT FALLS ASSESS-DOCD LE1/YR: CPT | Mod: CPTII,,, | Performed by: FAMILY MEDICINE

## 2024-08-08 PROCEDURE — 1159F MED LIST DOCD IN RCRD: CPT | Mod: CPTII,,, | Performed by: FAMILY MEDICINE

## 2024-08-08 PROCEDURE — 3288F FALL RISK ASSESSMENT DOCD: CPT | Mod: CPTII,,, | Performed by: FAMILY MEDICINE

## 2024-08-08 RX ORDER — FINASTERIDE 5 MG/1
5 TABLET, FILM COATED ORAL DAILY
Qty: 90 TABLET | Refills: 3 | Status: SHIPPED | OUTPATIENT
Start: 2024-08-08 | End: 2025-08-08

## 2024-08-08 RX ORDER — TAMSULOSIN HYDROCHLORIDE 0.4 MG/1
0.8 CAPSULE ORAL DAILY
Qty: 180 CAPSULE | Refills: 3 | Status: SHIPPED | OUTPATIENT
Start: 2024-08-08 | End: 2025-08-08

## 2024-08-09 ENCOUNTER — TELEPHONE (OUTPATIENT)
Dept: UROLOGY | Facility: CLINIC | Age: 74
End: 2024-08-09
Payer: MEDICARE

## 2024-10-04 NOTE — TELEPHONE ENCOUNTER
----- Message from Ramírez Díaz NP sent at 6/22/2023  2:12 PM CDT -----  Please notify patient of lab results within stable range  
LVM[facility] 6/22/23 @1532. AKIKO  
99

## 2025-01-17 ENCOUNTER — TELEPHONE (OUTPATIENT)
Dept: UROLOGY | Facility: CLINIC | Age: 75
End: 2025-01-17
Payer: MEDICARE

## 2025-01-17 DIAGNOSIS — N13.8 BPH WITH URINARY OBSTRUCTION: Primary | ICD-10-CM

## 2025-01-17 DIAGNOSIS — N40.1 BPH WITH URINARY OBSTRUCTION: Primary | ICD-10-CM

## 2025-01-17 RX ORDER — TAMSULOSIN HYDROCHLORIDE 0.4 MG/1
0.4 CAPSULE ORAL DAILY
Qty: 30 CAPSULE | Refills: 0 | Status: SHIPPED | OUTPATIENT
Start: 2025-01-17 | End: 2025-02-16

## 2025-01-17 RX ORDER — FINASTERIDE 5 MG/1
5 TABLET, FILM COATED ORAL DAILY
Qty: 30 TABLET | Refills: 0 | Status: SHIPPED | OUTPATIENT
Start: 2025-01-17 | End: 2025-02-17

## 2025-01-17 NOTE — TELEPHONE ENCOUNTER
MEDICATIONS SENT TO [facility]. 30 DAY SUPPLY OF FLOMAX AND FINASTERIDE.    ----- Message from Kanwal sent at 1/17/2025 10:27 AM CST -----  Contact: LA NENA MONTES [[id number]]  ..TYPE: Patient Requesting Refill     Who Called: LA NENA MONTES [[id number]]   Refill or New Rx: refill  RX Name and Strength: tamsulosin (FLOMAX) 0.4 mg Cap and finasteride (PROSCAR) 5 mg tablet  How is the patient currently taking it? (ex. 1XDay):   Is this a 30 day or 90 day RX: the [facility] is delayed needs just weeks worth  Preferred Pharmacy with phone number:   [id number] - [place], [place] - [address] [facility] [address] [place]  [address]  [place] [address]  Phone: [phone] Fax: [phone]  Local or Mail Order:local  Ordering Provider: vivi   Would the patient rather a call back or a response via [facility]?  call  Best Call Back Number: [phone]  Additional Information:

## 2025-05-21 DIAGNOSIS — Z76.89 ESTABLISHING CARE WITH NEW DOCTOR, ENCOUNTER FOR: Primary | ICD-10-CM

## 2025-05-22 ENCOUNTER — OFFICE VISIT (OUTPATIENT)
Dept: CARDIOTHORACIC SURGERY | Facility: CLINIC | Age: 75
End: 2025-05-22
Payer: MEDICARE

## 2025-05-22 VITALS — BODY MASS INDEX: 22.97 KG/M2 | WEIGHT: 142.31 LBS

## 2025-05-22 DIAGNOSIS — Z79.899 OTHER LONG TERM (CURRENT) DRUG THERAPY: ICD-10-CM

## 2025-05-22 DIAGNOSIS — Z01.811 PREPROCEDURAL RESPIRATORY EXAMINATION: ICD-10-CM

## 2025-05-22 DIAGNOSIS — I83.93 VARICOSE VEINS OF BOTH LOWER EXTREMITIES, UNSPECIFIED WHETHER COMPLICATED: ICD-10-CM

## 2025-05-22 DIAGNOSIS — Z01.812 PRE-PROCEDURE LAB EXAM: ICD-10-CM

## 2025-05-22 DIAGNOSIS — E55.9 VITAMIN D DEFICIENCY: ICD-10-CM

## 2025-05-22 DIAGNOSIS — R79.9 ABNORMAL BLOOD CHEMISTRY: ICD-10-CM

## 2025-05-22 DIAGNOSIS — Z76.89 ESTABLISHING CARE WITH NEW DOCTOR, ENCOUNTER FOR: ICD-10-CM

## 2025-05-22 DIAGNOSIS — I25.10 CORONARY ARTERY DISEASE, UNSPECIFIED VESSEL OR LESION TYPE, UNSPECIFIED WHETHER ANGINA PRESENT, UNSPECIFIED WHETHER NATIVE OR TRANSPLANTED HEART: Primary | ICD-10-CM

## 2025-05-22 DIAGNOSIS — Z87.891 HISTORY OF TOBACCO USE: ICD-10-CM

## 2025-05-22 DIAGNOSIS — Z01.810 PRE-PROCEDURAL CARDIOVASCULAR EXAMINATION: ICD-10-CM

## 2025-05-23 NOTE — PROGRESS NOTES
Subjective:      Patient ID: Cayden Handy is a 74 y.o. male who presents for evaluation of coronary artery disease    Chief Complaint: No chief complaint on file.    HPI 74-year-old gentleman with past medical history significant for type 2 diabetes mellitus, CKD 2, hypertension, hyperlipidemia BPH, history of TURP, skin cancer status post excision, history of renal calculi presents For surgical evaluation of coronary artery disease.  Patient is referred by her cardiologist Dr. Li.  Symptoms have included progressive angina with exertion.  Denies any radiation, denies any lower extremity edema, or orthopnea.Preliminary  STS score 1%    Left heart catheterization 05/19/2025 4 vessel coronary artery disease, moderate stenosis and distal left main, severe stenosis ostial LAD, moderate stenosis of the ostial circumflex, chronic total occlusion mid RCA which appears small, normal LVEDP     TTE 04/28/2025 LV ID 3.9, ejection fraction 58%, mild-to-moderate aortic insufficiency, mitral valve mild-to-moderate regurgitation, RVSP 22 mm Hg,    Twelve lead EKG sinus rhythm with occasional ectopic premature complex  Notable labs 05/08/2025 sodium 142, potassium 4.5, chloride 104, CO2 27, BUN 17, creatinine 1, AST 25, ALT 22, alkaline phosphate 53, white blood cell 7, hemoglobin 14.2, hematocrit 42,  Review of Systems   Constitutional: Positive for malaise/fatigue. Negative for chills and fever.   HENT:  Negative for congestion and hoarse voice.    Eyes:  Negative for blurred vision and double vision.   Cardiovascular:  Negative for chest pain and dyspnea on exertion.   Respiratory:  Negative for cough and shortness of breath.    Skin:  Negative for dry skin, itching and poor wound healing.   Musculoskeletal:  Negative for back pain and joint pain.   Gastrointestinal:  Negative for abdominal pain, constipation and diarrhea.   Genitourinary:  Negative for dysuria.   Neurological:  Negative for dizziness and  light-headedness.   Psychiatric/Behavioral:  Negative for depression. The patient is not nervous/anxious.       Past Medical History:   Diagnosis Date    Allergy     Diabetes 10/21/2019    Kidney stone     Mixed hyperlipidemia     Neuropathy     Retention, urine 10/21/2019      Past Surgical History:   Procedure Laterality Date    KIDNEY STONE SURGERY       lithotripsy x2    LITHOTRIPSY      vericocelectomy      a very long time cooley/ pt does not remember which side    WIDSOM TEETH        Family History   Problem Relation Name Age of Onset    Heart disease Father      Hypertension Father      Diabetes Father      Diabetes Mother      Hypertension Mother      Heart disease Mother        Social History     Socioeconomic History    Marital status:    Tobacco Use    Smoking status: Never    Smokeless tobacco: Never   Substance and Sexual Activity    Alcohol use: Yes     Alcohol/week: 1.0 standard drink of alcohol     Types: 1 Cans of beer per week     Comment: OCCASIONALLY    Drug use: Never    Sexual activity: Yes        Medication List with Changes/Refills   Current Medications    CONTOUR NEXT TEST STRIPS STRP    USE TO CHECK GLUCOSE EVERY MORNING AS DIRECTED    DOXAZOSIN (CARDURA) 4 MG TABLET        FINASTERIDE (PROSCAR) 5 MG TABLET    Take 1 tablet (5 mg total) by mouth once daily.    FREESTYLE LITE METER KIT        GLIPIZIDE (GLUCOTROL) 5 MG TABLET        METFORMIN (GLUCOPHAGE-XR) 500 MG ER 24HR TABLET        MONTELUKAST (SINGULAIR) 10 MG TABLET        PANTOPRAZOLE (PROTONIX) 40 MG TABLET    Take 40 mg by mouth.    ROSUVASTATIN (CRESTOR) 20 MG TABLET        TAMSULOSIN (FLOMAX) 0.4 MG CAP    Take 2 capsules (0.8 mg total) by mouth once daily.        Objective:     Wt 64.5 kg (142 lb 4.8 oz)   BMI 22.97 kg/m²     Physical Exam  HENT:      Head: Normocephalic.      Nose: Nose normal.      Mouth/Throat:      Mouth: Mucous membranes are moist.   Eyes:      Pupils: Pupils are equal, round, and reactive to light.    Cardiovascular:      Rate and Rhythm: Normal rate and regular rhythm.      Pulses: Normal pulses.   Pulmonary:      Effort: Pulmonary effort is normal.   Abdominal:      General: Abdomen is flat.      Palpations: Abdomen is soft.   Musculoskeletal:         General: Normal range of motion.      Cervical back: Normal range of motion.   Skin:     General: Skin is warm and dry.      Capillary Refill: Capillary refill takes less than 2 seconds.   Neurological:      Mental Status: He is alert and oriented to person, place, and time.   Psychiatric:         Mood and Affect: Mood normal.                Assessment & Plan:   Severe Multivessel CAD  Mild-to-moderate aortic insufficiency, mild-to-moderate mitral regurgitation  type 2 diabetes mellitus,  CKD 2  hypertension  hyperlipidemia   BPH  history of TURP  skin cancer status post excision   history of renal calculi     05/22/2025 Seen and examined with Dr. Sheppard discussed CABG times 3-4 vessels with left atrial appendage clip.  We will check vein mapping due to varicose veins, CT chest, carotid ultrasound, PFT with DLCO and preoperative lab work.  Risks benefits of surgery discussed and patient is willing to proceed.    Jacob ROGERS  [facility]

## 2025-06-02 DIAGNOSIS — I25.10 CORONARY ARTERY DISEASE, UNSPECIFIED VESSEL OR LESION TYPE, UNSPECIFIED WHETHER ANGINA PRESENT, UNSPECIFIED WHETHER NATIVE OR TRANSPLANTED HEART: Primary | ICD-10-CM

## 2025-06-11 ENCOUNTER — OUTSIDE PLACE OF SERVICE (OUTPATIENT)
Dept: CARDIOTHORACIC SURGERY | Facility: CLINIC | Age: 75
End: 2025-06-11

## 2025-06-13 ENCOUNTER — OUTSIDE PLACE OF SERVICE (OUTPATIENT)
Dept: CARDIOTHORACIC SURGERY | Facility: CLINIC | Age: 75
End: 2025-06-13
Payer: MEDICARE

## 2025-06-13 ENCOUNTER — OUTSIDE PLACE OF SERVICE (OUTPATIENT)
Dept: CARDIOTHORACIC SURGERY | Facility: CLINIC | Age: 75
End: 2025-06-13

## 2025-06-14 ENCOUNTER — OUTSIDE PLACE OF SERVICE (OUTPATIENT)
Dept: CARDIOTHORACIC SURGERY | Facility: CLINIC | Age: 75
End: 2025-06-14
Payer: MEDICARE

## 2025-06-15 ENCOUNTER — OUTSIDE PLACE OF SERVICE (OUTPATIENT)
Dept: CARDIOTHORACIC SURGERY | Facility: CLINIC | Age: 75
End: 2025-06-15
Payer: MEDICARE

## 2025-06-16 ENCOUNTER — OUTSIDE PLACE OF SERVICE (OUTPATIENT)
Dept: CARDIOTHORACIC SURGERY | Facility: CLINIC | Age: 75
End: 2025-06-16
Payer: MEDICARE

## 2025-06-18 ENCOUNTER — TELEPHONE (OUTPATIENT)
Dept: UROLOGY | Facility: CLINIC | Age: 75
End: 2025-06-18
Payer: MEDICARE

## 2025-06-18 NOTE — TELEPHONE ENCOUNTER
Pt calling to confirm his script of tamsulosin, I informed him that Dr. Guerra has pt on flomax bid every day.     Copied from CRM #[id number]. Topic: Medications - Medication Question  >> Jun 18, 2025  1:10 PM Ness wrote:  Patient wife is requesting a call back in regards medication clarification please call her back at [phone]

## 2025-06-19 ENCOUNTER — OFFICE VISIT (OUTPATIENT)
Dept: CARDIOTHORACIC SURGERY | Facility: CLINIC | Age: 75
End: 2025-06-19
Payer: MEDICARE

## 2025-06-19 ENCOUNTER — HOSPITAL ENCOUNTER (OUTPATIENT)
Dept: RADIOLOGY | Facility: CLINIC | Age: 75
Discharge: HOME | End: 2025-06-19
Attending: NURSE PRACTITIONER
Payer: MEDICARE

## 2025-06-19 VITALS
BODY MASS INDEX: 22.5 KG/M2 | HEART RATE: 66 BPM | HEIGHT: 66 IN | SYSTOLIC BLOOD PRESSURE: 118 MMHG | OXYGEN SATURATION: 96 % | RESPIRATION RATE: 18 BRPM | WEIGHT: 140 LBS | DIASTOLIC BLOOD PRESSURE: 77 MMHG

## 2025-06-19 DIAGNOSIS — Z95.1 S/P CABG (CORONARY ARTERY BYPASS GRAFT): ICD-10-CM

## 2025-06-19 DIAGNOSIS — R79.9 ABNORMAL BLOOD CHEMISTRY: ICD-10-CM

## 2025-06-19 DIAGNOSIS — Z95.1 S/P CABG (CORONARY ARTERY BYPASS GRAFT): Primary | ICD-10-CM

## 2025-06-19 LAB
OHS QRS DURATION: 92 MS
OHS QTC CALCULATION: 462 MS

## 2025-06-19 PROCEDURE — 71046 X-RAY EXAM CHEST 2 VIEWS: CPT | Mod: 26,,, | Performed by: RADIOLOGY

## 2025-06-19 PROCEDURE — 99024 POSTOP FOLLOW-UP VISIT: CPT | Mod: ,,, | Performed by: NURSE PRACTITIONER

## 2025-06-19 PROCEDURE — 1126F AMNT PAIN NOTED NONE PRSNT: CPT | Mod: CPTII,,, | Performed by: NURSE PRACTITIONER

## 2025-06-19 PROCEDURE — 4010F ACE/ARB THERAPY RXD/TAKEN: CPT | Mod: CPTII,,, | Performed by: NURSE PRACTITIONER

## 2025-06-19 PROCEDURE — 3074F SYST BP LT 130 MM HG: CPT | Mod: CPTII,,, | Performed by: NURSE PRACTITIONER

## 2025-06-19 PROCEDURE — 1159F MED LIST DOCD IN RCRD: CPT | Mod: CPTII,,, | Performed by: NURSE PRACTITIONER

## 2025-06-19 PROCEDURE — 93010 ELECTROCARDIOGRAM REPORT: CPT | Mod: ,,, | Performed by: INTERNAL MEDICINE

## 2025-06-19 PROCEDURE — 3078F DIAST BP <80 MM HG: CPT | Mod: CPTII,,, | Performed by: NURSE PRACTITIONER

## 2025-06-19 RX ORDER — FERROUS SULFATE 325(65) MG
325 TABLET ORAL
COMMUNITY
Start: 2025-06-17

## 2025-06-19 RX ORDER — LISINOPRIL 5 MG/1
5 TABLET ORAL
COMMUNITY
Start: 2025-05-08

## 2025-06-19 RX ORDER — CHOLECALCIFEROL (VITAMIN D3) 25 MCG
1000 TABLET ORAL
COMMUNITY

## 2025-06-19 RX ORDER — AMIODARONE HYDROCHLORIDE 400 MG/1
400 TABLET ORAL 2 TIMES DAILY
COMMUNITY

## 2025-06-19 RX ORDER — GABAPENTIN 100 MG/1
100 CAPSULE ORAL 3 TIMES DAILY
COMMUNITY
Start: 2025-06-16

## 2025-06-19 RX ORDER — CLOPIDOGREL BISULFATE 75 MG/1
75 TABLET ORAL
COMMUNITY
Start: 2025-06-17

## 2025-06-19 RX ORDER — ASPIRIN 81 MG/1
81 TABLET ORAL
COMMUNITY

## 2025-06-19 RX ORDER — LANOLIN ALCOHOL/MO/W.PET/CERES
1 CREAM (GRAM) TOPICAL 2 TIMES DAILY
COMMUNITY
Start: 2025-06-16

## 2025-06-19 RX ORDER — METOPROLOL TARTRATE 25 MG/1
37.5 TABLET, FILM COATED ORAL
COMMUNITY
Start: 2025-06-16

## 2025-06-19 RX ORDER — IBUPROFEN 100 MG/5ML
1000 SUSPENSION, ORAL (FINAL DOSE FORM) ORAL
COMMUNITY
Start: 2025-06-16

## 2025-06-25 ENCOUNTER — OFFICE VISIT (OUTPATIENT)
Dept: CARDIOTHORACIC SURGERY | Facility: CLINIC | Age: 75
End: 2025-06-25
Payer: MEDICARE

## 2025-06-25 ENCOUNTER — HOSPITAL ENCOUNTER (OUTPATIENT)
Dept: RADIOLOGY | Facility: CLINIC | Age: 75
Discharge: HOME | End: 2025-06-25
Attending: NURSE PRACTITIONER
Payer: MEDICARE

## 2025-06-25 VITALS
HEIGHT: 66 IN | WEIGHT: 136.5 LBS | SYSTOLIC BLOOD PRESSURE: 128 MMHG | RESPIRATION RATE: 18 BRPM | BODY MASS INDEX: 21.94 KG/M2 | HEART RATE: 69 BPM | DIASTOLIC BLOOD PRESSURE: 82 MMHG | OXYGEN SATURATION: 97 %

## 2025-06-25 DIAGNOSIS — Z95.1 S/P CABG (CORONARY ARTERY BYPASS GRAFT): Primary | ICD-10-CM

## 2025-06-25 DIAGNOSIS — R79.9 ABNORMAL BLOOD CHEMISTRY: ICD-10-CM

## 2025-06-25 DIAGNOSIS — Z95.1 S/P CABG (CORONARY ARTERY BYPASS GRAFT): ICD-10-CM

## 2025-06-25 PROCEDURE — 1126F AMNT PAIN NOTED NONE PRSNT: CPT | Mod: CPTII,,, | Performed by: NURSE PRACTITIONER

## 2025-06-25 PROCEDURE — 93010 ELECTROCARDIOGRAM REPORT: CPT | Mod: ,,, | Performed by: INTERNAL MEDICINE

## 2025-06-25 PROCEDURE — 99024 POSTOP FOLLOW-UP VISIT: CPT | Mod: ,,, | Performed by: NURSE PRACTITIONER

## 2025-06-25 PROCEDURE — 4010F ACE/ARB THERAPY RXD/TAKEN: CPT | Mod: CPTII,,, | Performed by: NURSE PRACTITIONER

## 2025-06-25 PROCEDURE — 71046 X-RAY EXAM CHEST 2 VIEWS: CPT | Mod: 26,,, | Performed by: RADIOLOGY

## 2025-06-25 PROCEDURE — 3074F SYST BP LT 130 MM HG: CPT | Mod: CPTII,,, | Performed by: NURSE PRACTITIONER

## 2025-06-25 PROCEDURE — 3079F DIAST BP 80-89 MM HG: CPT | Mod: CPTII,,, | Performed by: NURSE PRACTITIONER

## 2025-06-25 RX ORDER — OSELTAMIVIR PHOSPHATE 75 MG/1
75 CAPSULE ORAL DAILY
Qty: 10 CAPSULE | Refills: 0 | Status: SHIPPED | OUTPATIENT
Start: 2025-06-25 | End: 2025-07-05

## 2025-06-25 NOTE — PROGRESS NOTES
Subjective:      Patient ID: Cayden Handy is a 74 y.o. male who presents for evaluation of coronary artery disease    Chief Complaint: post op - s/p CABG 6/11    HPI 74-year-old gentleman with past medical history significant for type 2 diabetes mellitus, CKD 2, hypertension, hyperlipidemia BPH, history of TURP, skin cancer status post excision, history of renal calculi presents For surgical evaluation of coronary artery disease.  Patient is referred by her cardiologist Dr. Li.  Symptoms have included progressive angina with exertion.  Denies any radiation, denies any lower extremity edema, or orthopnea.Preliminary  STS score 1%    Left heart catheterization 05/19/2025 4 vessel coronary artery disease, moderate stenosis and distal left main, severe stenosis ostial LAD, moderate stenosis of the ostial circumflex, chronic total occlusion mid RCA which appears small, normal LVEDP     TTE 04/28/2025 LV ID 3.9, ejection fraction 58%, mild-to-moderate aortic insufficiency, mitral valve mild-to-moderate regurgitation, RVSP 22 mm Hg,    Twelve lead EKG sinus rhythm with occasional ectopic premature complex  Notable labs 05/08/2025 sodium 142, potassium 4.5, chloride 104, CO2 27, BUN 17, creatinine 1, AST 25, ALT 22, alkaline phosphate 53, white blood cell 7, hemoglobin 14.2, hematocrit 42,    Clinic 6/19/25 Presents for a hospital f/u s/p cabg. Overall doing well! Denies any problems with his surgical incision. Denies problems with chest pain and shortness of breath. Reports blood glucose has been elevated.  Review of Systems   Constitutional: Positive for malaise/fatigue. Negative for chills and fever.   HENT:  Negative for congestion and hoarse voice.    Eyes:  Negative for blurred vision and double vision.   Cardiovascular:  Negative for chest pain and dyspnea on exertion.   Respiratory:  Negative for cough and shortness of breath.    Skin:  Negative for dry skin, itching and poor wound healing.    Musculoskeletal:  Negative for back pain and joint pain.   Gastrointestinal:  Negative for abdominal pain, constipation and diarrhea.   Genitourinary:  Negative for dysuria.   Neurological:  Negative for dizziness and light-headedness.   Psychiatric/Behavioral:  Negative for depression. The patient is not nervous/anxious.       Past Medical History:   Diagnosis Date    Allergy     Diabetes 10/21/2019    Kidney stone     Mixed hyperlipidemia     Neuropathy     Retention, urine 10/21/2019      Past Surgical History:   Procedure Laterality Date    CORONARY ARTERY BYPASS GRAFT  06/11/2025    Off-pump coronary artery bypass grafting x4 with left internal mammary artery to distal LAD, saphenous vein graft to proximal LAD,saphenous vein graft to diagonal artery,saphenous vein graft to PDA. Left atrial appendage occlusion with 45 mm AtriCure clip,Endo vein harvesting of the pt's left greater saphenous vein, Medist[employer] flow measurements in all grafts, sternal plating using [employer] system    KIDNEY STONE SURGERY       lithotripsy x2    LITHOTRIPSY      vericocelectomy      a very long time cooley/ pt does not remember which side    WIDSOM TEETH        Family History   Problem Relation Name Age of Onset    Heart disease Father      Hypertension Father      Diabetes Father      Diabetes Mother      Hypertension Mother      Heart disease Mother        Social History     Socioeconomic History    Marital status:    Tobacco Use    Smoking status: Never    Smokeless tobacco: Never   Substance and Sexual Activity    Alcohol use: Yes     Alcohol/week: 1.0 standard drink of alcohol     Types: 1 Cans of beer per week     Comment: OCCASIONALLY    Drug use: Never    Sexual activity: Yes     Social Drivers of Health     Financial Resource Strain: Low Risk  (6/12/2025)    Received from [facility]    Overall Financial Resource Strain (CARDIA)     Difficulty of Paying Living Expenses: Not hard at all   Food Insecurity: No Food  Insecurity (6/12/2025)    Received from [facility]    Hunger Vital Sign     Worried About Running Out of Food in the Last Year: Never true     Ran Out of Food in the Last Year: Never true   Transportation Needs: No Transportation Needs (6/12/2025)    Received from [facility]    PRAPARE - Transportation     In the past 12 months, has lack of transportation kept you from medical appointments or from getting medications?: No   Housing Stability: Low Risk  (6/12/2025)    Received from [facility]    Housing Stability Vital Sign     At any time in the past 12 months, were you homeless or living in a shelter (including now)?: No        Medication List with Changes/Refills   Current Medications    AMIODARONE (PACERONE) 400 MG TABLET    Take 400 mg by mouth 2 (two) times daily.    ASCORBIC ACID, VITAMIN C, (VITAMIN C) 1000 MG TABLET    Take 1,000 mg by mouth.    ASPIRIN (ECOTRIN) 81 MG EC TABLET    Take 81 mg by mouth.    CLOPIDOGREL (PLAVIX) 75 MG TABLET    Take 75 mg by mouth.    CONTOUR NEXT TEST STRIPS STRP    USE TO CHECK GLUCOSE EVERY MORNING AS DIRECTED    DOXAZOSIN (CARDURA) 4 MG TABLET        FERROUS SULFATE (FEOSOL) 325 MG (65 MG IRON) TAB TABLET    Take 325 mg by mouth.    FINASTERIDE (PROSCAR) 5 MG TABLET    Take 1 tablet (5 mg total) by mouth once daily.    FREESTYLE LITE METER KIT        GABAPENTIN (NEURONTIN) 100 MG CAPSULE    Take 100 mg by mouth 3 (three) times daily.    GLIPIZIDE (GLUCOTROL) 5 MG TABLET        LISINOPRIL (PRINIVIL,ZESTRIL) 5 MG TABLET    Take 5 mg by mouth.    MAGNESIUM OXIDE (MAG-OX) 400 MG (241.3 MG MAGNESIUM) TABLET    Take 1 tablet by mouth 2 (two) times daily.    METFORMIN (GLUCOPHAGE-XR) 500 MG ER 24HR TABLET        METOPROLOL TARTRATE (LOPRESSOR) 25 MG TABLET    Take 37.5 mg by mouth.    MONTELUKAST (SINGULAIR) 10 MG TABLET        PANTOPRAZOLE (PROTONIX) 40 MG TABLET    Take 40 mg by mouth.    ROSUVASTATIN (CRESTOR) 20 MG TABLET        TAMSULOSIN (FLOMAX) 0.4 MG CAP     "Take 2 capsules (0.8 mg total) by mouth once daily.    VITAMIN D (VITAMIN D3) 1000 UNITS TAB    Take 1,000 Units by mouth.        Objective:     /77 (BP Location: Left arm, Patient Position: Sitting)   Pulse 66   Resp 18   Ht 5' 6" (1.676 m)   Wt 63.5 kg (140 lb)   SpO2 96%   BMI 22.60 kg/m²     Physical Exam  HENT:      Head: Normocephalic.      Nose: Nose normal.      Mouth/Throat:      Mouth: Mucous membranes are moist.   Eyes:      Pupils: Pupils are equal, round, and reactive to light.   Cardiovascular:      Rate and Rhythm: Normal rate and regular rhythm.      Pulses: Normal pulses.   Pulmonary:      Effort: Pulmonary effort is normal.   Abdominal:      General: Abdomen is flat.      Palpations: Abdomen is soft.   Musculoskeletal:         General: Normal range of motion.      Cervical back: Normal range of motion.   Skin:     General: Skin is warm and dry.      Capillary Refill: Capillary refill takes less than 2 seconds.   Neurological:      Mental Status: He is alert and oriented to person, place, and time.   Psychiatric:         Mood and Affect: Mood normal.                Assessment & Plan:   Severe Multivessel CAD S/p CABG  Mild-to-moderate aortic insufficiency, mild-to-moderate mitral regurgitation  type 2 diabetes mellitus,  CKD 2  hypertension  hyperlipidemia   BPH  history of TURP  skin cancer status post excision   history of renal calculi     05/22/2025 Seen and examined with Dr. Sheppard discussed CABG times 3-4 vessels with left atrial appendage clip.  We will check vein mapping due to varicose veins, CT chest, carotid ultrasound, PFT with DLCO and preoperative lab work.  Risks benefits of surgery discussed and patient is willing to proceed.    6/19/25 Doing well! Blood glucose has been elevated. We will increase glipizide to preop dose of 5mg. Stop isosorbide. Decrease amiodarone to 400 mg daily. Continue IS and progressive mobility. Check labs and F/U in 1 week and PRN.     Jacob " Katey FNP-C  Cardothoracic Surgery

## 2025-06-26 DIAGNOSIS — D72.829 LEUKOCYTOSIS, UNSPECIFIED TYPE: ICD-10-CM

## 2025-06-26 DIAGNOSIS — Z95.1 S/P CABG (CORONARY ARTERY BYPASS GRAFT): ICD-10-CM

## 2025-06-26 DIAGNOSIS — E87.5 POTASSIUM SERUM INCREASED: Primary | ICD-10-CM

## 2025-06-26 DIAGNOSIS — R79.9 ABNORMAL BLOOD CHEMISTRY: ICD-10-CM

## 2025-06-26 LAB
OHS QRS DURATION: 88 MS
OHS QTC CALCULATION: 390 MS

## 2025-07-02 ENCOUNTER — OFFICE VISIT (OUTPATIENT)
Dept: CARDIOTHORACIC SURGERY | Facility: CLINIC | Age: 75
End: 2025-07-02
Payer: MEDICARE

## 2025-07-02 ENCOUNTER — HOSPITAL ENCOUNTER (OUTPATIENT)
Dept: RADIOLOGY | Facility: CLINIC | Age: 75
Discharge: HOME | End: 2025-07-02
Attending: NURSE PRACTITIONER
Payer: MEDICARE

## 2025-07-02 VITALS
HEIGHT: 66 IN | DIASTOLIC BLOOD PRESSURE: 83 MMHG | HEART RATE: 71 BPM | OXYGEN SATURATION: 97 % | WEIGHT: 138 LBS | RESPIRATION RATE: 18 BRPM | SYSTOLIC BLOOD PRESSURE: 118 MMHG | BODY MASS INDEX: 22.18 KG/M2

## 2025-07-02 DIAGNOSIS — Z95.1 S/P CABG (CORONARY ARTERY BYPASS GRAFT): ICD-10-CM

## 2025-07-02 DIAGNOSIS — R79.9 ABNORMAL BLOOD CHEMISTRY: ICD-10-CM

## 2025-07-02 DIAGNOSIS — Z95.1 S/P CABG (CORONARY ARTERY BYPASS GRAFT): Primary | ICD-10-CM

## 2025-07-02 PROCEDURE — 3074F SYST BP LT 130 MM HG: CPT | Mod: CPTII,,, | Performed by: NURSE PRACTITIONER

## 2025-07-02 PROCEDURE — 1159F MED LIST DOCD IN RCRD: CPT | Mod: CPTII,,, | Performed by: NURSE PRACTITIONER

## 2025-07-02 PROCEDURE — 71046 X-RAY EXAM CHEST 2 VIEWS: CPT | Mod: 26,,, | Performed by: RADIOLOGY

## 2025-07-02 PROCEDURE — 3079F DIAST BP 80-89 MM HG: CPT | Mod: CPTII,,, | Performed by: NURSE PRACTITIONER

## 2025-07-02 PROCEDURE — 1126F AMNT PAIN NOTED NONE PRSNT: CPT | Mod: CPTII,,, | Performed by: NURSE PRACTITIONER

## 2025-07-02 PROCEDURE — 4010F ACE/ARB THERAPY RXD/TAKEN: CPT | Mod: CPTII,,, | Performed by: NURSE PRACTITIONER

## 2025-07-02 PROCEDURE — 99024 POSTOP FOLLOW-UP VISIT: CPT | Mod: ,,, | Performed by: NURSE PRACTITIONER

## 2025-07-02 NOTE — PROGRESS NOTES
Subjective:      Patient ID: Cayden Handy is a 74 y.o. male who presents for evaluation of coronary artery disease    Chief Complaint: 1 wk f/u - s/p CABG    HPI 74-year-old gentleman with past medical history significant for type 2 diabetes mellitus, CKD 2, hypertension, hyperlipidemia BPH, history of TURP, skin cancer status post excision, history of renal calculi presents For surgical evaluation of coronary artery disease.  Patient is referred by her cardiologist Dr. Li.  Symptoms have included progressive angina with exertion.  Denies any radiation, denies any lower extremity edema, or orthopnea.Preliminary  STS score 1%    Left heart catheterization 05/19/2025 4 vessel coronary artery disease, moderate stenosis and distal left main, severe stenosis ostial LAD, moderate stenosis of the ostial circumflex, chronic total occlusion mid RCA which appears small, normal LVEDP     TTE 04/28/2025 LV ID 3.9, ejection fraction 58%, mild-to-moderate aortic insufficiency, mitral valve mild-to-moderate regurgitation, RVSP 22 mm Hg,    Twelve lead EKG sinus rhythm with occasional ectopic premature complex  Notable labs 05/08/2025 sodium 142, potassium 4.5, chloride 104, CO2 27, BUN 17, creatinine 1, AST 25, ALT 22, alkaline phosphate 53, white blood cell 7, hemoglobin 14.2, hematocrit 42,    Clinic 6/19/25 Presents for a hospital f/u s/p cabg. Overall doing well! Denies any problems with his surgical incision. Denies problems with chest pain and shortness of breath. Reports blood glucose has been elevated.    Clinic 6/25/25 patient reports intermittent arm pain a few days ago that has now resolved.  His wife tested positive for flu.  He denies any chest pain, shortness of breath, abdominal pain, nausea, vomiting, fever, or chills  Review of Systems   Constitutional: Positive for malaise/fatigue. Negative for chills and fever.   HENT:  Negative for congestion and hoarse voice.    Eyes:  Negative for blurred vision and  double vision.   Cardiovascular:  Negative for chest pain and dyspnea on exertion.   Respiratory:  Negative for cough and shortness of breath.    Skin:  Negative for dry skin, itching and poor wound healing.   Musculoskeletal:  Negative for back pain and joint pain.   Gastrointestinal:  Negative for abdominal pain, constipation and diarrhea.   Genitourinary:  Negative for dysuria.   Neurological:  Negative for dizziness and light-headedness.   Psychiatric/Behavioral:  Negative for depression. The patient is not nervous/anxious.       Past Medical History:   Diagnosis Date    Allergy     Diabetes 10/21/2019    Kidney stone     Mixed hyperlipidemia     Neuropathy     Retention, urine 10/21/2019      Past Surgical History:   Procedure Laterality Date    CORONARY ARTERY BYPASS GRAFT  06/11/2025    Off-pump coronary artery bypass grafting x4 with left internal mammary artery to distal LAD, saphenous vein graft to proximal LAD,saphenous vein graft to diagonal artery,saphenous vein graft to PDA. Left atrial appendage occlusion with 45 mm AtriCure clip,Endo vein harvesting of the pt's left greater saphenous vein, Medistim flow measurements in all grafts, sternal plating using Back[employer] system    KIDNEY STONE SURGERY       lithotripsy x2    LITHOTRIPSY      vericocelectomy      a very long time cooley/ pt does not remember which side    WIDSOM TEETH        Family History   Problem Relation Name Age of Onset    Heart disease Father      Hypertension Father      Diabetes Father      Diabetes Mother      Hypertension Mother      Heart disease Mother        Social History     Socioeconomic History    Marital status:    Tobacco Use    Smoking status: Never    Smokeless tobacco: Never   Substance and Sexual Activity    Alcohol use: Yes     Alcohol/week: 1.0 standard drink of alcohol     Types: 1 Cans of beer per week     Comment: OCCASIONALLY    Drug use: Never    Sexual activity: Yes     Social Drivers of Health      Financial Resource Strain: Low Risk  (6/12/2025)    Received from [facility]    Overall Financial Resource Strain (CARDIA)     Difficulty of Paying Living Expenses: Not hard at all   Food Insecurity: No Food Insecurity (6/12/2025)    Received from [facility]    Hunger Vital Sign     Worried About Running Out of Food in the Last Year: Never true     Ran Out of Food in the Last Year: Never true   Transportation Needs: No Transportation Needs (6/12/2025)    Received from [facility]    PRAPARE - Transportation     In the past 12 months, has lack of transportation kept you from medical appointments or from getting medications?: No   Housing Stability: Low Risk  (6/12/2025)    Received from [facility]    Housing Stability Vital Sign     At any time in the past 12 months, were you homeless or living in a shelter (including now)?: No        Medication List with Changes/Refills   New Medications    OSELTAMIVIR (TAMIFLU) 75 MG CAPSULE    Take 1 capsule (75 mg total) by mouth once daily. for 10 days   Current Medications    AMIODARONE (PACERONE) 400 MG TABLET    Take 200 mg by mouth once daily.    ASCORBIC ACID, VITAMIN C, (VITAMIN C) 1000 MG TABLET    Take 1,000 mg by mouth.    ASPIRIN (ECOTRIN) 81 MG EC TABLET    Take 81 mg by mouth.    CLOPIDOGREL (PLAVIX) 75 MG TABLET    Take 75 mg by mouth.    CONTOUR NEXT TEST STRIPS STRP    USE TO CHECK GLUCOSE EVERY MORNING AS DIRECTED    DOXAZOSIN (CARDURA) 4 MG TABLET        FERROUS SULFATE (FEOSOL) 325 MG (65 MG IRON) TAB TABLET    Take 325 mg by mouth.    FINASTERIDE (PROSCAR) 5 MG TABLET    Take 1 tablet (5 mg total) by mouth once daily.    FREESTYLE LITE METER KIT        GABAPENTIN (NEURONTIN) 100 MG CAPSULE    Take 100 mg by mouth 3 (three) times daily.    GLIPIZIDE (GLUCOTROL) 5 MG TABLET    5 mg daily with breakfast.    LISINOPRIL (PRINIVIL,ZESTRIL) 5 MG TABLET    Take 5 mg by mouth.    MAGNESIUM OXIDE (MAG-OX) 400 MG (241.3 MG MAGNESIUM) TABLET    Take  "1 tablet by mouth 2 (two) times daily.    METFORMIN (GLUCOPHAGE-XR) 500 MG ER 24HR TABLET    500 mg 2 (two) times daily with meals.    METOPROLOL TARTRATE (LOPRESSOR) 25 MG TABLET    Take 37.5 mg by mouth.    MONTELUKAST (SINGULAIR) 10 MG TABLET        PANTOPRAZOLE (PROTONIX) 40 MG TABLET    Take 40 mg by mouth.    ROSUVASTATIN (CRESTOR) 20 MG TABLET        TAMSULOSIN (FLOMAX) 0.4 MG CAP    Take 2 capsules (0.8 mg total) by mouth once daily.    VITAMIN D (VITAMIN D3) 1000 UNITS TAB    Take 1,000 Units by mouth.        Objective:     /82 (BP Location: Right arm, Patient Position: Sitting)   Pulse 69   Resp 18   Ht 5' 6" (1.676 m)   Wt 61.9 kg (136 lb 8 oz)   SpO2 97%   BMI 22.03 kg/m²     Physical Exam  HENT:      Head: Normocephalic.      Nose: Nose normal.      Mouth/Throat:      Mouth: Mucous membranes are moist.   Eyes:      Pupils: Pupils are equal, round, and reactive to light.   Cardiovascular:      Rate and Rhythm: Normal rate and regular rhythm.      Pulses: Normal pulses.   Pulmonary:      Effort: Pulmonary effort is normal.   Abdominal:      General: Abdomen is flat.      Palpations: Abdomen is soft.   Musculoskeletal:         General: Normal range of motion.      Cervical back: Normal range of motion.   Skin:     General: Skin is warm and dry.      Capillary Refill: Capillary refill takes less than 2 seconds.      Comments: Sternum well-approximated, no erythema or drainage.  Saphenous vein graft harvest site well approximated no erythema.   Neurological:      Mental Status: He is alert and oriented to person, place, and time.   Psychiatric:         Mood and Affect: Mood normal.                Assessment & Plan:   Severe Multivessel CAD S/p CABG  Mild-to-moderate aortic insufficiency, mild-to-moderate mitral regurgitation  type 2 diabetes mellitus,  CKD 2  hypertension  hyperlipidemia   BPH  history of TURP  skin cancer status post excision   history of renal calculi     05/22/2025 Seen and " examined with Dr. Sheppard discussed CABG times 3-4 vessels with left atrial appendage clip.  We will check vein mapping due to varicose veins, CT chest, carotid ultrasound, PFT with DLCO and preoperative lab work.  Risks benefits of surgery discussed and patient is willing to proceed.    6/19/25 Doing well! Blood glucose has been elevated. We will increase glipizide to preop dose of 5mg. Stop isosorbide. Decrease amiodarone to 400 mg daily. Continue IS and progressive mobility. Check labs and F/U in 1 week and PRN.     06/25/2025 patient with exposure to flu a.  We will call with Tamiflu prophylactic.  We are we will decrease amiodarone to 200 mg daily.  We will encourage Glucerna.  Patient will follow up with PCP for glucose management.  We will check labs today and follow up in one-week.  Continue IS and progressive mobility (labs reviewed with the patient potassium slightly elevated we will stop lisinopril avoid bananas repeat labs tomorrow potassium improved from 5.2 to 5)    Jacob Del Toro FNP-C  [facility]

## 2025-07-08 NOTE — PROGRESS NOTES
Subjective:      Patient ID: Cayden Handy is a 74 y.o. male who presents for evaluation of coronary artery disease    Chief Complaint: 1 wk f/u - s/p CABG    HPI 74-year-old gentleman with past medical history significant for type 2 diabetes mellitus, CKD 2, hypertension, hyperlipidemia BPH, history of TURP, skin cancer status post excision, history of renal calculi presents For surgical evaluation of coronary artery disease.  Patient is referred by her cardiologist Dr. Li.  Symptoms have included progressive angina with exertion.  Denies any radiation, denies any lower extremity edema, or orthopnea.Preliminary  STS score 1%    Left heart catheterization 05/19/2025 4 vessel coronary artery disease, moderate stenosis and distal left main, severe stenosis ostial LAD, moderate stenosis of the ostial circumflex, chronic total occlusion mid RCA which appears small, normal LVEDP     TTE 04/28/2025 LV ID 3.9, ejection fraction 58%, mild-to-moderate aortic insufficiency, mitral valve mild-to-moderate regurgitation, RVSP 22 mm Hg,    Twelve lead EKG sinus rhythm with occasional ectopic premature complex  Notable labs 05/08/2025 sodium 142, potassium 4.5, chloride 104, CO2 27, BUN 17, creatinine 1, AST 25, ALT 22, alkaline phosphate 53, white blood cell 7, hemoglobin 14.2, hematocrit 42,    Clinic 6/19/25 Presents for a [facility] f/u s/p cabg. Overall doing well! Denies any problems with his surgical incision. Denies problems with chest pain and shortness of breath. Reports blood glucose has been elevated.    Clinic 6/25/25 patient reports intermittent arm pain a few days ago that has now resolved.  His wife tested positive for flu.  He denies any chest pain, shortness of breath, abdominal pain, nausea, vomiting, fever, or chills    Clinic 7/2/25 Seen by cardiology prior to this appointment and ECG was obtained. Holter monitored ordered through there office for palpitations. Amiodarone decreased to 200mg daily per  "there rec. Reports intermittent incisional pain described as "Jolts" rated 2-3/10. No pressure sensation. Does not occur inrelation to activity.   Review of Systems   Constitutional: Negative for chills and fever.   HENT:  Negative for congestion and hoarse voice.    Eyes:  Negative for blurred vision and double vision.   Cardiovascular:  Negative for chest pain and dyspnea on exertion.   Respiratory:  Negative for cough and shortness of breath.    Skin:  Negative for dry skin, itching and poor wound healing.   Musculoskeletal:  Negative for back pain and joint pain.   Gastrointestinal:  Negative for abdominal pain, constipation and diarrhea.   Genitourinary:  Negative for dysuria.   Neurological:  Negative for dizziness and light-headedness.   Psychiatric/Behavioral:  Negative for depression. The patient is not nervous/anxious.       Past Medical History:   Diagnosis Date    Allergy     Diabetes 10/21/2019    Kidney stone     Mixed hyperlipidemia     Neuropathy     Retention, urine 10/21/2019      Past Surgical History:   Procedure Laterality Date    CORONARY ARTERY BYPASS GRAFT  06/11/2025    Off-pump coronary artery bypass grafting x4 with left internal mammary artery to distal LAD, saphenous vein graft to proximal LAD,saphenous vein graft to diagonal artery,saphenous vein graft to PDA. Left atrial appendage occlusion with 45 mm AtriCure clip,Endo vein harvesting of the pt's left greater saphenous vein, Medistim flow measurements in all grafts, sternal plating using [employer] system    KIDNEY STONE SURGERY       lithotripsy x2    LITHOTRIPSY      vericocelectomy      a very long time cooley/ pt does not remember which side    WIDSOM TEETH        Family History   Problem Relation Name Age of Onset    Heart disease Father      Hypertension Father      Diabetes Father      Diabetes Mother      Hypertension Mother      Heart disease Mother        Social History     Socioeconomic History    Marital status:  "   Tobacco Use    Smoking status: Never    Smokeless tobacco: Never   Substance and Sexual Activity    Alcohol use: Yes     Alcohol/week: 1.0 standard drink of alcohol     Types: 1 Cans of beer per week     Comment: OCCASIONALLY    Drug use: Never    Sexual activity: Yes     Social Drivers of Health     Financial Resource Strain: Low Risk  (6/12/2025)    Received from [facility]    Overall Financial Resource Strain (CARDIA)     Difficulty of Paying Living Expenses: Not hard at all   Food Insecurity: No Food Insecurity (6/12/2025)    Received from [facility]    Hunger Vital Sign     Worried About Running Out of Food in the Last Year: Never true     Ran Out of Food in the Last Year: Never true   Transportation Needs: No Transportation Needs (6/12/2025)    Received from [facility] - Transportation     In the past 12 months, has lack of transportation kept you from medical appointments or from getting medications?: No   Housing Stability: Low Risk  (6/12/2025)    Received from [facility]    Housing Stability Vital Sign     At any time in the past 12 months, were you homeless or living in a shelter (including now)?: No        Medication List with Changes/Refills   Current Medications    AMIODARONE (PACERONE) 400 MG TABLET    Take 200 mg by mouth once daily.    ASCORBIC ACID, VITAMIN C, (VITAMIN C) 1000 MG TABLET    Take 1,000 mg by mouth.    ASPIRIN (ECOTRIN) 81 MG EC TABLET    Take 81 mg by mouth.    CLOPIDOGREL (PLAVIX) 75 MG TABLET    Take 75 mg by mouth.    CONTOUR NEXT TEST STRIPS STRP    USE TO CHECK GLUCOSE EVERY MORNING AS DIRECTED    DOXAZOSIN (CARDURA) 4 MG TABLET        FERROUS SULFATE (FEOSOL) 325 MG (65 MG IRON) TAB TABLET    Take 325 mg by mouth.    FINASTERIDE (PROSCAR) 5 MG TABLET    Take 1 tablet (5 mg total) by mouth once daily.    FREESTYLE LITE METER KIT        GABAPENTIN (NEURONTIN) 100 MG CAPSULE    Take 100 mg by mouth 3 (three) times daily.    GLIPIZIDE (GLUCOTROL) 5  "MG TABLET    5 mg daily with breakfast.    LISINOPRIL (PRINIVIL,ZESTRIL) 5 MG TABLET    Take 5 mg by mouth.    MAGNESIUM OXIDE (MAG-OX) 400 MG (241.3 MG MAGNESIUM) TABLET    Take 1 tablet by mouth 2 (two) times daily.    METFORMIN (GLUCOPHAGE-XR) 500 MG ER 24HR TABLET    500 mg 2 (two) times daily with meals.    METOPROLOL TARTRATE (LOPRESSOR) 25 MG TABLET    Take 37.5 mg by mouth.    MONTELUKAST (SINGULAIR) 10 MG TABLET        PANTOPRAZOLE (PROTONIX) 40 MG TABLET    Take 40 mg by mouth.    ROSUVASTATIN (CRESTOR) 20 MG TABLET        TAMSULOSIN (FLOMAX) 0.4 MG CAP    Take 2 capsules (0.8 mg total) by mouth once daily.    VITAMIN D (VITAMIN D3) 1000 UNITS TAB    Take 1,000 Units by mouth.        Objective:     /83 (BP Location: Left arm, Patient Position: Sitting)   Pulse 71   Resp 18   Ht 5' 6" (1.676 m)   Wt 62.6 kg (138 lb)   SpO2 97%   BMI 22.27 kg/m²     Physical Exam  HENT:      Head: Normocephalic.      Nose: Nose normal.      Mouth/Throat:      Mouth: Mucous membranes are moist.   Eyes:      Pupils: Pupils are equal, round, and reactive to light.   Cardiovascular:      Rate and Rhythm: Normal rate and regular rhythm.      Pulses: Normal pulses.   Pulmonary:      Effort: Pulmonary effort is normal.   Abdominal:      General: Abdomen is flat.      Palpations: Abdomen is soft.   Musculoskeletal:         General: Normal range of motion.      Cervical back: Normal range of motion.   Skin:     General: Skin is warm and dry.      Capillary Refill: Capillary refill takes less than 2 seconds.      Comments: Sternum well-approximated, no erythema or drainage.  Saphenous vein graft harvest site well approximated no erythema.   Neurological:      Mental Status: He is alert and oriented to person, place, and time.   Psychiatric:         Mood and Affect: Mood normal.                Assessment & Plan:   Severe Multivessel CAD S/p CABG  Mild-to-moderate aortic insufficiency, mild-to-moderate mitral " regurgitation  type 2 diabetes mellitus,  CKD 2  hypertension  hyperlipidemia   BPH  history of TURP  skin cancer status post excision   history of renal calculi     05/22/2025 Seen and examined with Dr. Sheppard discussed CABG times 3-4 vessels with left atrial appendage clip.  We will check vein mapping due to varicose veins, CT chest, carotid ultrasound, PFT with DLCO and preoperative lab work.  Risks benefits of surgery discussed and patient is willing to proceed.    6/19/25 Doing well! Blood glucose has been elevated. We will increase glipizide to preop dose of 5mg. Stop isosorbide. Decrease amiodarone to 400 mg daily. Continue IS and progressive mobility. Check labs and F/U in 1 week and PRN.     06/25/2025 patient with exposure to flu a.  We will call with Tamiflu prophylactic.  We are we will decrease amiodarone to 200 mg daily.  We will encourage Glucerna.  Patient will follow up with PCP for glucose management.  We will check labs today and follow up in one-week.  Continue IS and progressive mobility (labs reviewed with the patient potassium slightly elevated we will stop lisinopril avoid bananas repeat labs tomorrow potassium improved from 5.2 to 5)    7/8/25 Holter monitor per cardiology for complaints of palpitations. SR today. Amiodarone decreased to 200mg per card rec. Check labs today and f/u in 1 week. Continue IS and progressive mobility    Jacob FERNANDEZ-C  Cardothorac Surgery

## 2025-07-11 ENCOUNTER — OFFICE VISIT (OUTPATIENT)
Dept: CARDIOTHORACIC SURGERY | Facility: CLINIC | Age: 75
End: 2025-07-11
Payer: MEDICARE

## 2025-07-11 VITALS
RESPIRATION RATE: 18 BRPM | BODY MASS INDEX: 22.07 KG/M2 | OXYGEN SATURATION: 96 % | SYSTOLIC BLOOD PRESSURE: 143 MMHG | DIASTOLIC BLOOD PRESSURE: 79 MMHG | HEART RATE: 66 BPM | HEIGHT: 66 IN | WEIGHT: 137.31 LBS

## 2025-07-11 DIAGNOSIS — Z95.1 S/P CABG (CORONARY ARTERY BYPASS GRAFT): Primary | ICD-10-CM

## 2025-07-11 DIAGNOSIS — E55.9 VITAMIN D DEFICIENCY: ICD-10-CM

## 2025-07-11 DIAGNOSIS — R79.9 ABNORMAL BLOOD CHEMISTRY: ICD-10-CM

## 2025-07-11 LAB
OHS QRS DURATION: 88 MS
OHS QTC CALCULATION: 420 MS

## 2025-07-11 PROCEDURE — 1159F MED LIST DOCD IN RCRD: CPT | Mod: CPTII,,, | Performed by: NURSE PRACTITIONER

## 2025-07-11 PROCEDURE — 1126F AMNT PAIN NOTED NONE PRSNT: CPT | Mod: CPTII,,, | Performed by: NURSE PRACTITIONER

## 2025-07-11 PROCEDURE — 3077F SYST BP >= 140 MM HG: CPT | Mod: CPTII,,, | Performed by: NURSE PRACTITIONER

## 2025-07-11 PROCEDURE — 3078F DIAST BP <80 MM HG: CPT | Mod: CPTII,,, | Performed by: NURSE PRACTITIONER

## 2025-07-11 PROCEDURE — 4010F ACE/ARB THERAPY RXD/TAKEN: CPT | Mod: CPTII,,, | Performed by: NURSE PRACTITIONER

## 2025-07-11 PROCEDURE — 93010 ELECTROCARDIOGRAM REPORT: CPT | Mod: ,,, | Performed by: INTERNAL MEDICINE

## 2025-07-11 PROCEDURE — 99024 POSTOP FOLLOW-UP VISIT: CPT | Mod: ,,, | Performed by: NURSE PRACTITIONER

## 2025-07-12 NOTE — PROGRESS NOTES
Subjective:      Patient ID: Cayden Handy is a 74 y.o. male who presents for evaluation of coronary artery disease    Chief Complaint: No chief complaint on file.    HPI 74-year-old gentleman with past medical history significant for type 2 diabetes mellitus, CKD 2, hypertension, hyperlipidemia BPH, history of TURP, skin cancer status post excision, history of renal calculi presents For surgical evaluation of coronary artery disease.  Patient is referred by her cardiologist Dr. Li.  Symptoms have included progressive angina with exertion.  Denies any radiation, denies any lower extremity edema, or orthopnea.Preliminary  STS score 1%    Left heart catheterization 05/19/2025 4 vessel coronary artery disease, moderate stenosis and distal left main, severe stenosis ostial LAD, moderate stenosis of the ostial circumflex, chronic total occlusion mid RCA which appears small, normal LVEDP     TTE 04/28/2025 LV ID 3.9, ejection fraction 58%, mild-to-moderate aortic insufficiency, mitral valve mild-to-moderate regurgitation, RVSP 22 mm Hg,    Twelve lead EKG sinus rhythm with occasional ectopic premature complex  Notable labs 05/08/2025 sodium 142, potassium 4.5, chloride 104, CO2 27, BUN 17, creatinine 1, AST 25, ALT 22, alkaline phosphate 53, white blood cell 7, hemoglobin 14.2, hematocrit 42,    Clinic 6/19/25 Presents for a hospital f/u s/p cabg. Overall doing well! Denies any problems with his surgical incision. Denies problems with chest pain and shortness of breath. Reports blood glucose has been elevated.    Clinic 6/25/25 patient reports intermittent arm pain a few days ago that has now resolved.  His wife tested positive for flu.  He denies any chest pain, shortness of breath, abdominal pain, nausea, vomiting, fever, or chills    Clinic 7/2/25 Seen by cardiology prior to this appointment and ECG was obtained. Holter monitored ordered through there office for palpitations. Amiodarone decreased to 200mg daily  "per there rec. Reports intermittent incisional pain described as "Jolts" rated 2-3/10. No pressure sensation. Does not occur inrelation to activity.     Clinic 7/12/25 Doing well! Denies any chest pain or shortness of breath. Here for 30 day f/u s/p cabg. Denies anymore pain or palpitations. Currently wearing a holter monitor      Review of Systems   Constitutional: Negative for chills and fever.   HENT:  Negative for congestion and hoarse voice.    Eyes:  Negative for blurred vision and double vision.   Cardiovascular:  Negative for chest pain and dyspnea on exertion.   Respiratory:  Negative for cough and shortness of breath.    Skin:  Negative for dry skin, itching and poor wound healing.   Musculoskeletal:  Negative for back pain and joint pain.   Gastrointestinal:  Negative for abdominal pain, constipation and diarrhea.   Genitourinary:  Negative for dysuria.   Neurological:  Negative for dizziness and light-headedness.   Psychiatric/Behavioral:  Negative for depression. The patient is not nervous/anxious.       Past Medical History:   Diagnosis Date    Allergy     Diabetes 10/21/2019    Kidney stone     Mixed hyperlipidemia     Neuropathy     Retention, urine 10/21/2019      Past Surgical History:   Procedure Laterality Date    CORONARY ARTERY BYPASS GRAFT  06/11/2025    Off-pump coronary artery bypass grafting x4 with left internal mammary artery to distal LAD, saphenous vein graft to proximal LAD,saphenous vein graft to diagonal artery,saphenous vein graft to PDA. Left atrial appendage occlusion with 45 mm AtriCure clip,Endo vein harvesting of the pt's left greater saphenous vein, Medistim flow measurements in all grafts, sternal plating using [employer] system    KIDNEY STONE SURGERY       lithotripsy x2    LITHOTRIPSY      vericocelectomy      a very long time cooley/ pt does not remember which side    WIDSOM TEETH        Family History   Problem Relation Name Age of Onset    Heart disease Father      " Hypertension Father      Diabetes Father      Diabetes Mother      Hypertension Mother      Heart disease Mother        Social History     Socioeconomic History    Marital status:    Tobacco Use    Smoking status: Never    Smokeless tobacco: Never   Substance and Sexual Activity    Alcohol use: Yes     Alcohol/week: 1.0 standard drink of alcohol     Types: 1 Cans of beer per week     Comment: OCCASIONALLY    Drug use: Never    Sexual activity: Yes     Social Drivers of Health     Financial Resource Strain: Low Risk  (6/12/2025)    Received from [facility]    Overall Financial Resource Strain (CARDIA)     Difficulty of Paying Living Expenses: Not hard at all   Food Insecurity: No Food Insecurity (6/12/2025)    Received from [facility]    Hunger Vital Sign     Worried About Running Out of Food in the Last Year: Never true     Ran Out of Food in the Last Year: Never true   Transportation Needs: No Transportation Needs (6/12/2025)    [facility] from [facility]    [facility]     In the past 12 months, has lack of transportation kept you from medical appointments or from getting medications?: No   Housing Stability: Low Risk  (6/12/2025)    Received from [facility]    Housing Stability Vital Sign     At any time in the past 12 months, were you homeless or living in a shelter (including now)?: No        Medication List with Changes/Refills   Current Medications    AMIODARONE (PACERONE) 400 MG TABLET    Take 200 mg by mouth once daily.    ASCORBIC ACID, VITAMIN C, (VITAMIN C) 1000 MG TABLET    Take 1,000 mg by mouth.    ASPIRIN (ECOTRIN) 81 MG EC TABLET    Take 81 mg by mouth.    CLOPIDOGREL (PLAVIX) 75 MG TABLET    Take 75 mg by mouth.    CONTOUR NEXT TEST STRIPS STRP    USE TO CHECK GLUCOSE EVERY MORNING AS DIRECTED    DOXAZOSIN (CARDURA) 4 MG TABLET        FERROUS SULFATE (FEOSOL) 325 MG (65 MG IRON) TAB TABLET    Take 325 mg by mouth.    FINASTERIDE (PROSCAR) 5 MG TABLET    Take 1  "tablet (5 mg total) by mouth once daily.    FREESTYLE LITE METER KIT        GABAPENTIN (NEURONTIN) 100 MG CAPSULE    Take 100 mg by mouth 3 (three) times daily.    GLIPIZIDE (GLUCOTROL) 5 MG TABLET    5 mg daily with breakfast.    LISINOPRIL (PRINIVIL,ZESTRIL) 5 MG TABLET    Take 5 mg by mouth.    MAGNESIUM OXIDE (MAG-OX) 400 MG (241.3 MG MAGNESIUM) TABLET    Take 1 tablet by mouth 2 (two) times daily.    METFORMIN (GLUCOPHAGE-XR) 500 MG ER 24HR TABLET    500 mg 2 (two) times daily with meals.    METOPROLOL TARTRATE (LOPRESSOR) 25 MG TABLET    Take 37.5 mg by mouth.    MONTELUKAST (SINGULAIR) 10 MG TABLET        PANTOPRAZOLE (PROTONIX) 40 MG TABLET    Take 40 mg by mouth.    ROSUVASTATIN (CRESTOR) 20 MG TABLET        TAMSULOSIN (FLOMAX) 0.4 MG CAP    Take 2 capsules (0.8 mg total) by mouth once daily.    VITAMIN D (VITAMIN D3) 1000 UNITS TAB    Take 1,000 Units by mouth.        Objective:     BP (!) 143/79 (BP Location: Left arm, Patient Position: Sitting)   Pulse 66   Resp 18   Ht 5' 6" (1.676 m)   Wt 62.3 kg (137 lb 4.8 oz)   SpO2 96%   BMI 22.16 kg/m²     Physical Exam  HENT:      Head: Normocephalic.      Nose: Nose normal.      Mouth/Throat:      Mouth: Mucous membranes are moist.   Eyes:      Pupils: Pupils are equal, round, and reactive to light.   Cardiovascular:      Rate and Rhythm: Normal rate and regular rhythm.      Pulses: Normal pulses.   Pulmonary:      Effort: Pulmonary effort is normal.   Abdominal:      General: Abdomen is flat.      Palpations: Abdomen is soft.   Musculoskeletal:         General: Normal range of motion.      Cervical back: Normal range of motion.   Skin:     General: Skin is warm and dry.      Capillary Refill: Capillary refill takes less than 2 seconds.      Comments: Sternum well-approximated, no erythema or drainage.  Saphenous vein graft harvest site well approximated no erythema.   Neurological:      Mental Status: He is alert and oriented to person, place, and time. "   Psychiatric:         Mood and Affect: Mood normal.                Assessment & Plan:   Severe Multivessel CAD S/p CABG  Mild-to-moderate aortic insufficiency, mild-to-moderate mitral regurgitation  type 2 diabetes mellitus,  CKD 2  hypertension  hyperlipidemia   BPH  history of TURP  skin cancer status post excision   history of renal calculi   Hyperkalemia -resolved c removal of ace    05/22/2025 Seen and examined with Dr. Sheppard discussed CABG times 3-4 vessels with left atrial appendage clip.  We will check vein mapping due to varicose veins, CT chest, carotid ultrasound, PFT with DLCO and preoperative lab work.  Risks benefits of surgery discussed and patient is willing to proceed.    6/19/25 Doing well! Blood glucose has been elevated. We will increase glipizide to preop dose of 5mg. Stop isosorbide. Decrease amiodarone to 400 mg daily. Continue IS and progressive mobility. Check labs and F/U in 1 week and PRN.     06/25/2025 patient with exposure to flu a.  We will call with Tamiflu prophylactic.  We are we will decrease amiodarone to 200 mg daily.  We will encourage Glucerna.  Patient will follow up with PCP for glucose management.  We will check labs today and follow up in one-week.  Continue IS and progressive mobility (labs reviewed with the patient potassium slightly elevated we will stop lisinopril avoid bananas repeat labs tomorrow potassium improved from 5.2 to 5)    7/8/25 Holter monitor per cardiology for complaints of palpitations. SR today. Amiodarone decreased to 200mg per card rec. Check labs today and f/u in 1 week. Continue IS and progressive mobility    7/11/25 Looks great! Ecg today is stable. Currently wearing a Holter monitor. He will f/u with cardiology for results and weaning of amiodarone. Currently on 200mg daily. Stop vitamin c, magnesium. [facility]. Continue sternal precautions for 4 more weeks.F/u pcp and cardiology RTC PRN. ( Labs reviewed and stable,  Vitamin D level low normal recommend continued supplementation and f/u with pcp for titration)  Jacob Del Toro FNP-C  Cardothoracic Surgery

## 2025-08-08 ENCOUNTER — OFFICE VISIT (OUTPATIENT)
Dept: UROLOGY | Facility: CLINIC | Age: 75
End: 2025-08-08
Payer: MEDICARE

## 2025-08-08 ENCOUNTER — OFFICE VISIT (OUTPATIENT)
Dept: CARDIOTHORACIC SURGERY | Facility: CLINIC | Age: 75
End: 2025-08-08
Payer: MEDICARE

## 2025-08-08 VITALS — RESPIRATION RATE: 18 BRPM | HEIGHT: 66 IN | BODY MASS INDEX: 22.07 KG/M2 | HEART RATE: 58 BPM | WEIGHT: 137.31 LBS

## 2025-08-08 VITALS
WEIGHT: 137.38 LBS | RESPIRATION RATE: 19 BRPM | BODY MASS INDEX: 22.08 KG/M2 | HEART RATE: 31 BPM | DIASTOLIC BLOOD PRESSURE: 71 MMHG | SYSTOLIC BLOOD PRESSURE: 136 MMHG | HEIGHT: 66 IN

## 2025-08-08 DIAGNOSIS — Z95.1 S/P CABG (CORONARY ARTERY BYPASS GRAFT): Primary | ICD-10-CM

## 2025-08-08 DIAGNOSIS — N13.8 BPH WITH URINARY OBSTRUCTION: Primary | ICD-10-CM

## 2025-08-08 DIAGNOSIS — N40.1 BPH WITH URINARY OBSTRUCTION: Primary | ICD-10-CM

## 2025-08-08 DIAGNOSIS — R00.1 BRADYCARDIA: ICD-10-CM

## 2025-08-08 LAB
OHS QRS DURATION: 90 MS
OHS QTC CALCULATION: 447 MS
PSA, DIAGNOSTIC: 0.08 NG/ML (ref 0–4)

## 2025-08-08 PROCEDURE — 4010F ACE/ARB THERAPY RXD/TAKEN: CPT | Mod: CPTII,,, | Performed by: NURSE PRACTITIONER

## 2025-08-08 PROCEDURE — 1159F MED LIST DOCD IN RCRD: CPT | Mod: CPTII,,, | Performed by: NURSE PRACTITIONER

## 2025-08-08 PROCEDURE — 1126F AMNT PAIN NOTED NONE PRSNT: CPT | Mod: CPTII,,, | Performed by: NURSE PRACTITIONER

## 2025-08-08 PROCEDURE — 93010 ELECTROCARDIOGRAM REPORT: CPT | Mod: ,,, | Performed by: INTERNAL MEDICINE

## 2025-08-08 PROCEDURE — 99024 POSTOP FOLLOW-UP VISIT: CPT | Mod: ,,, | Performed by: NURSE PRACTITIONER

## 2025-08-08 RX ORDER — NITROGLYCERIN 0.4 MG/1
0.4 TABLET SUBLINGUAL
COMMUNITY

## 2025-08-08 RX ORDER — LEVOCETIRIZINE DIHYDROCHLORIDE 5 MG/1
5 TABLET, FILM COATED ORAL
COMMUNITY
Start: 2025-02-19

## 2025-08-08 NOTE — PROGRESS NOTES
Subjective:       Patient ID: Cayden Handy is a 74 y.o. male.    Chief Complaint: No chief complaint on file.      HPI: 74-year-old male presenting to the clinic for yearly follow-up. Known to service. History of BPH with LUTS.  He is currently on Flomax and finasteride and overall doing well. Previous bladder scans have been around 250 mL postvoid.  He also had cystoscopy noting bladder neck contracture and distal bulbar urethral stricture noted on 08/17/2023.  Patient did not have any intervention.  Overall he was doing well and on bothered by his symptoms.  He states he is not getting up at night anymore.         Past Medical History:   Past Medical History:   Diagnosis Date    Allergy     Diabetes 10/21/2019    Kidney stone     Mixed hyperlipidemia     Neuropathy     Retention, urine 10/21/2019       Past Surgical Historical:   Past Surgical History:   Procedure Laterality Date    CORONARY ARTERY BYPASS GRAFT  06/11/2025    Off-pump coronary artery bypass grafting x4 with left internal mammary artery to distal LAD, saphenous vein graft to proximal LAD,saphenous vein graft to diagonal artery,saphenous vein graft to PDA. Left atrial appendage occlusion with 45 mm AtriCure clip,Endo vein harvesting of the pt's left greater saphenous vein, Medistim flow measurements in all grafts, sternal plating using [employer] system    KIDNEY STONE SURGERY       lithotripsy x2    LITHOTRIPSY      vericocelectomy      a very long time cooley/ pt does not remember which side    WIDSOM TEETH          Medications:   Medication List with Changes/Refills   Current Medications    AMIODARONE (PACERONE) 400 MG TABLET    Take 200 mg by mouth once daily.    ASCORBIC ACID, VITAMIN C, (VITAMIN C) 1000 MG TABLET    Take 1,000 mg by mouth.    ASPIRIN (ECOTRIN) 81 MG EC TABLET    Take 81 mg by mouth.    CLOPIDOGREL (PLAVIX) 75 MG TABLET    Take 75 mg by mouth.    CONTOUR NEXT TEST STRIPS STRP    USE TO CHECK GLUCOSE EVERY MORNING AS DIRECTED     DOXAZOSIN (CARDURA) 4 MG TABLET        FERROUS SULFATE (FEOSOL) 325 MG (65 MG IRON) TAB TABLET    Take 325 mg by mouth.    FINASTERIDE (PROSCAR) 5 MG TABLET    Take 1 tablet (5 mg total) by mouth once daily.    FREESTYLE LITE METER KIT        GABAPENTIN (NEURONTIN) 100 MG CAPSULE    Take 100 mg by mouth 3 (three) times daily.    GLIPIZIDE (GLUCOTROL) 5 MG TABLET    5 mg daily with breakfast.    LEVOCETIRIZINE (XYZAL) 5 MG TABLET    Take 5 mg by mouth.    LISINOPRIL (PRINIVIL,ZESTRIL) 5 MG TABLET    Take 5 mg by mouth.    MAGNESIUM OXIDE (MAG-OX) 400 MG (241.3 MG MAGNESIUM) TABLET    Take 1 tablet by mouth 2 (two) times daily.    METFORMIN (GLUCOPHAGE-XR) 500 MG ER 24HR TABLET    500 mg 2 (two) times daily with meals.    METOPROLOL TARTRATE (LOPRESSOR) 25 MG TABLET    Take 37.5 mg by mouth.    MONTELUKAST (SINGULAIR) 10 MG TABLET        NITROGLYCERIN (NITROSTAT) 0.4 MG SL TABLET    Place 0.4 mg under the tongue.    PANTOPRAZOLE (PROTONIX) 40 MG TABLET    Take 40 mg by mouth.    ROSUVASTATIN (CRESTOR) 20 MG TABLET        TAMSULOSIN (FLOMAX) 0.4 MG CAP    Take 2 capsules (0.8 mg total) by mouth once daily.    VITAMIN D (VITAMIN D3) 1000 UNITS TAB    Take 1,000 Units by mouth.        Past Social History:   Social History     Socioeconomic History    Marital status:    Tobacco Use    Smoking status: Never    Smokeless tobacco: Never   Substance and Sexual Activity    Alcohol use: Yes     Alcohol/week: 1.0 standard drink of alcohol     Types: 1 Cans of beer per week     Comment: OCCASIONALLY    Drug use: Never    Sexual activity: Yes     Social Drivers of Health     Financial Resource Strain: Low Risk  (6/12/2025)    [facility]    Overall Financial Resource Strain (CARDIA)     Difficulty of Paying Living Expenses: Not hard at all   Food Insecurity: No Food Insecurity (6/12/2025)    Received from [facility]    Hunger Vital Sign     Worried About Running Out of Food in the Last Year: Never  true     Ran Out of Food in the Last Year: Never true   Transportation Needs: No Transportation Needs (6/12/2025)    Received from [facility] - Transportation     In the past 12 months, has lack of transportation kept you from medical appointments or from getting medications?: No   Housing Stability: Low Risk  (6/12/2025)    Received from [facility]    Housing Stability Vital Sign     At any time in the past 12 months, were you homeless or living in a shelter (including now)?: No       Allergies:   Review of patient's allergies indicates:   Allergen Reactions    Grass pollen-ray grass standard     Grass pollen-red top, standard      Other Reaction(s): Cough, Cough        Family History:   Family History   Problem Relation Name Age of Onset    Heart disease Father      Hypertension Father      Diabetes Father      Diabetes Mother      Hypertension Mother      Heart disease Mother          Review of Systems:  Review of Systems   Constitutional: Negative.  Negative for activity change and appetite change.   HENT: Negative.  Negative for congestion and dental problem.    Eyes: Negative.  Negative for visual disturbance.   Respiratory: Negative.  Negative for chest tightness and shortness of breath.    Cardiovascular: Negative.  Negative for chest pain.   Gastrointestinal: Negative.  Negative for abdominal distention and abdominal pain.   Endocrine: Negative.    Genitourinary:  Positive for difficulty urinating. Negative for decreased urine volume, dysuria, enuresis, flank pain, frequency, genital sores, hematuria, penile discharge, penile pain, penile swelling, scrotal swelling, testicular pain and urgency.   Musculoskeletal: Negative.  Negative for back pain and neck pain.   Skin: Negative.  Negative for color change.   Allergic/Immunologic: Negative.    Neurological: Negative.  Negative for dizziness.   Hematological: Negative.  Negative for adenopathy.   Psychiatric/Behavioral: Negative.   Negative for agitation, behavioral problems and confusion.        Physical Exam:  Physical Exam  Constitutional:       Appearance: He is well-developed.   HENT:      Head: Normocephalic.   Eyes:      General: No scleral icterus.  Pulmonary:      Effort: Pulmonary effort is normal.      Breath sounds: Normal breath sounds.   Abdominal:      General: There is no distension.      Palpations: Abdomen is soft.      Tenderness: There is no abdominal tenderness.      Hernia: No hernia is present. There is no hernia in the right inguinal area or left inguinal area.   Genitourinary:     Penis: Normal.       Testes: Normal. Cremasteric reflex is present.   Musculoskeletal:      Cervical back: Normal range of motion.   Skin:     General: Skin is warm and dry.   Neurological:      Mental Status: He is alert and oriented to person, place, and time.         Assessment/Plan:   BPH with LUTS--PVR improved to 78 mL.  mL. Worse if he tries to push the urine out.  We will refill Flomax and finasteride at this time.  Notify our clinic if symptoms worsen prior to 1 year follow up.  We will draw the patient's PSA notify him of results when they are received  Problem List Items Addressed This Visit    None

## 2025-08-09 NOTE — PROGRESS NOTES
Subjective:      Patient ID: Cayden Handy is a 74 y.o. male who presents for evaluation of coronary artery disease    Chief Complaint: Bradycardia    HPI 74-year-old gentleman with past medical history significant for type 2 diabetes mellitus, CKD 2, hypertension, hyperlipidemia BPH, history of TURP, skin cancer status post excision, history of renal calculi presents For surgical evaluation of coronary artery disease.  Patient is referred by her cardiologist Dr. Li.  Symptoms have included progressive angina with exertion.  Denies any radiation, denies any lower extremity edema, or orthopnea.Preliminary  STS score 1%    Left heart catheterization 05/19/2025 4 vessel coronary artery disease, moderate stenosis and distal left main, severe stenosis ostial LAD, moderate stenosis of the ostial circumflex, chronic total occlusion mid RCA which appears small, normal LVEDP     TTE 04/28/2025 LV ID 3.9, ejection fraction 58%, mild-to-moderate aortic insufficiency, mitral valve mild-to-moderate regurgitation, RVSP 22 mm Hg,    Twelve lead EKG sinus rhythm with occasional ectopic premature complex  Notable labs 05/08/2025 sodium 142, potassium 4.5, chloride 104, CO2 27, BUN 17, creatinine 1, AST 25, ALT 22, alkaline phosphate 53, white blood cell 7, hemoglobin 14.2, hematocrit 42,    Clinic 6/19/25 Presents for a hospital f/u s/p cabg. Overall doing well! Denies any problems with his surgical incision. Denies problems with chest pain and shortness of breath. Reports blood glucose has been elevated.    Clinic 6/25/25 patient reports intermittent arm pain a few days ago that has now resolved.  His wife tested positive for flu.  He denies any chest pain, shortness of breath, abdominal pain, nausea, vomiting, fever, or chills    Clinic 7/2/25 Seen by cardiology prior to this appointment and ECG was obtained. Holter monitored ordered through there office for palpitations. Amiodarone decreased to 200mg daily per there rec.  "Reports intermittent incisional pain described as "Jolts" rated 2-3/10. No pressure sensation. Does not occur inrelation to activity.     Clinic 7/12/25 Doing well! Denies any chest pain or shortness of breath. Here for 30 day f/u s/p cabg. Denies anymore pain or palpitations. Currently wearing a holter monitor    Clinic 8/8/25 Seen as a walk-in today. He was in there urology office and was noted to have a HR 0f 30. He reports going to cardiac rehab just prior to urology appointment and walking 2 miles. He does endorse some dizziness mostly with standing or changing positions. Denies any chest pain or shortness of breath. Patient reports just prior to vital sign measurements he attempted to micturate.   Review of Systems   Constitutional: Negative for chills and fever.   HENT:  Negative for congestion and hoarse voice.    Eyes:  Negative for blurred vision and double vision.   Cardiovascular:  Negative for chest pain and dyspnea on exertion.   Respiratory:  Negative for cough and shortness of breath.    Skin:  Negative for dry skin, itching and poor wound healing.   Musculoskeletal:  Negative for back pain and joint pain.   Gastrointestinal:  Negative for abdominal pain, constipation and diarrhea.   Genitourinary:  Negative for dysuria.   Neurological:  Negative for dizziness and light-headedness.   Psychiatric/Behavioral:  Negative for depression. The patient is not nervous/anxious.       Past Medical History:   Diagnosis Date    Allergy     Diabetes 10/21/2019    Kidney stone     Mixed hyperlipidemia     Neuropathy     Retention, urine 10/21/2019      Past Surgical History:   Procedure Laterality Date    CORONARY ARTERY BYPASS GRAFT  06/11/2025    Off-pump coronary artery bypass grafting x4 with left internal mammary artery to distal LAD, saphenous vein graft to proximal LAD,saphenous vein graft to diagonal artery,saphenous vein graft to PDA. Left atrial appendage occlusion with 45 mm AtriCure clip,Endo vein " harvesting of the pt's left greater saphenous vein, Medistim flow measurements in all grafts, sternal plating using Jez plates system    KIDNEY STONE SURGERY       lithotripsy x2    LITHOTRIPSY      vericocelectomy      a very long time cooley/ pt does not remember which side    WIDSOM TEETH        Family History   Problem Relation Name Age of Onset    Heart disease Father      Hypertension Father      Diabetes Father      Diabetes Mother      Hypertension Mother      Heart disease Mother        Social History     Socioeconomic History    Marital status:    Tobacco Use    Smoking status: Never    Smokeless tobacco: Never   Substance and Sexual Activity    Alcohol use: Yes     Alcohol/week: 1.0 standard drink of alcohol     Types: 1 Cans of beer per week     Comment: OCCASIONALLY    Drug use: Never    Sexual activity: Yes     Social Drivers of Health     Financial Resource Strain: Low Risk  (6/12/2025)    Received from [facility]    Overall Financial Resource Strain (CARDIA)     Difficulty of Paying Living Expenses: Not hard at all   Food Insecurity: No Food Insecurity (6/12/2025)    [facility]    Hunger Vital Sign     Worried About Running Out of Food in the Last Year: Never true     Ran Out of Food in the Last Year: Never true   Transportation Needs: No Transportation Needs (6/12/2025)    [facility]    [facility]     In the past 12 months, has lack of transportation kept you from medical appointments or from getting medications?: No   Housing Stability: Low Risk  (6/12/2025)    [facility]    Housing Stability Vital Sign     At any time in the past 12 months, were you homeless or living in a shelter (including now)?: No        Medication List with Changes/Refills   Current Medications    AMIODARONE (PACERONE) 400 MG TABLET    Take 200 mg by mouth once daily.    ASCORBIC ACID, VITAMIN C, (VITAMIN C) 1000 MG TABLET    Take 1,000 mg by  "mouth.    ASPIRIN (ECOTRIN) 81 MG EC TABLET    Take 81 mg by mouth.    CLOPIDOGREL (PLAVIX) 75 MG TABLET    Take 75 mg by mouth.    CONTOUR NEXT TEST STRIPS STRP    USE TO CHECK GLUCOSE EVERY MORNING AS DIRECTED    DOXAZOSIN (CARDURA) 4 MG TABLET        FERROUS SULFATE (FEOSOL) 325 MG (65 MG IRON) TAB TABLET    Take 325 mg by mouth.    FINASTERIDE (PROSCAR) 5 MG TABLET    Take 1 tablet (5 mg total) by mouth once daily.    FREESTYLE LITE METER KIT        GABAPENTIN (NEURONTIN) 100 MG CAPSULE    Take 100 mg by mouth 3 (three) times daily.    GLIPIZIDE (GLUCOTROL) 5 MG TABLET    5 mg daily with breakfast.    LEVOCETIRIZINE (XYZAL) 5 MG TABLET    Take 5 mg by mouth.    LISINOPRIL (PRINIVIL,ZESTRIL) 5 MG TABLET    Take 5 mg by mouth.    MAGNESIUM OXIDE (MAG-OX) 400 MG (241.3 MG MAGNESIUM) TABLET    Take 1 tablet by mouth 2 (two) times daily.    METFORMIN (GLUCOPHAGE-XR) 500 MG ER 24HR TABLET    500 mg 2 (two) times daily with meals.    METOPROLOL TARTRATE (LOPRESSOR) 25 MG TABLET    Take 37.5 mg by mouth.    MONTELUKAST (SINGULAIR) 10 MG TABLET        NITROGLYCERIN (NITROSTAT) 0.4 MG SL TABLET    Place 0.4 mg under the tongue.    PANTOPRAZOLE (PROTONIX) 40 MG TABLET    Take 40 mg by mouth.    ROSUVASTATIN (CRESTOR) 20 MG TABLET        TAMSULOSIN (FLOMAX) 0.4 MG CAP    Take 2 capsules (0.8 mg total) by mouth once daily.    VITAMIN D (VITAMIN D3) 1000 UNITS TAB    Take 1,000 Units by mouth.        Objective:     Pulse (!) 58   Resp 18   Ht 5' 6" (1.676 m)   Wt 62.3 kg (137 lb 4.8 oz)   BMI 22.16 kg/m²     Physical Exam  HENT:      Head: Normocephalic.      Nose: Nose normal.      Mouth/Throat:      Mouth: Mucous membranes are moist.   Eyes:      Pupils: Pupils are equal, round, and reactive to light.   Cardiovascular:      Rate and Rhythm: Normal rate and regular rhythm.      Pulses: Normal pulses.   Pulmonary:      Effort: Pulmonary effort is normal.   Abdominal:      General: Abdomen is flat.      Palpations: Abdomen " is soft.   Musculoskeletal:         General: Normal range of motion.      Cervical back: Normal range of motion.   Skin:     General: Skin is warm and dry.      Capillary Refill: Capillary refill takes less than 2 seconds.      Comments: Sternum well-approximated, no erythema or drainage.  Saphenous vein graft harvest site well approximated no erythema.   Neurological:      Mental Status: He is alert and oriented to person, place, and time.   Psychiatric:         Mood and Affect: Mood normal.                Assessment & Plan:   bradycardia  Severe Multivessel CAD S/p CABG  Mild-to-moderate aortic insufficiency, mild-to-moderate mitral regurgitation  type 2 diabetes mellitus,  CKD 2  hypertension  hyperlipidemia   BPH  history of TURP  skin cancer status post excision   history of renal calculi   Hyperkalemia -resolved c removal of ace    05/22/2025 Seen and examined with Dr. Sheppard discussed CABG times 3-4 vessels with left atrial appendage clip.  We will check vein mapping due to varicose veins, CT chest, carotid ultrasound, PFT with DLCO and preoperative lab work.  Risks benefits of surgery discussed and patient is willing to proceed.    6/19/25 Doing well! Blood glucose has been elevated. We will increase glipizide to preop dose of 5mg. Stop isosorbide. Decrease amiodarone to 400 mg daily. Continue IS and progressive mobility. Check labs and F/U in 1 week and PRN.     06/25/2025 patient with exposure to flu a.  We will call with Tamiflu prophylactic.  We are we will decrease amiodarone to 200 mg daily.  We will encourage Glucerna.  Patient will follow up with PCP for glucose management.  We will check labs today and follow up in one-week.  Continue IS and progressive mobility (labs reviewed with the patient potassium slightly elevated we will stop lisinopril avoid bananas repeat labs tomorrow potassium improved from 5.2 to 5)    7/8/25 Holter monitor per cardiology for complaints of palpitations. SR today.  Amiodarone decreased to 200mg per card rec. Check labs today and f/u in 1 week. Continue IS and progressive mobility    7/11/25 Looks great! Ecg today is stable. Currently wearing a Holter monitor. He will f/u with cardiology for results and weaning of amiodarone. Currently on 200mg daily. Stop vitamin c, magnesium. [facility]. Continue sternal precautions for 4 more weeks.F/u pcp and cardiology RTC PRN. ( Labs reviewed and stable, Vitamin D level low normal recommend continued supplementation and f/u with pcp for titration)    8/9/25 Discussed case with cardiologist Dr. Li. Today in the office, EKG demonstrated SB rate of 58. He is not currently symptomatic. We will stop the metoprolol and amiodarone. Holter monitor report was unavailable at Dr. Li's office but he has f/u next week with Suhail NP to go over report. ER precautions given to patient in case he gets reoccurrence of low HR or become symptomatic. Verbalized understanding.  Otherwise he reports to be recovering well from surgery with healed incisions  Jacob Del Toro FNP-C  [facility]

## 2025-08-11 ENCOUNTER — TELEPHONE (OUTPATIENT)
Dept: UROLOGY | Facility: CLINIC | Age: 75
End: 2025-08-11
Payer: MEDICARE